# Patient Record
Sex: FEMALE | Race: WHITE | NOT HISPANIC OR LATINO | Employment: FULL TIME | URBAN - METROPOLITAN AREA
[De-identification: names, ages, dates, MRNs, and addresses within clinical notes are randomized per-mention and may not be internally consistent; named-entity substitution may affect disease eponyms.]

---

## 2018-06-04 ENCOUNTER — HOSPITAL ENCOUNTER (EMERGENCY)
Facility: CLINIC | Age: 53
Discharge: HOME | End: 2018-06-04
Attending: EMERGENCY MEDICINE | Admitting: EMERGENCY MEDICINE
Payer: COMMERCIAL

## 2018-06-04 ENCOUNTER — TELEPHONE (OUTPATIENT)
Dept: CARDIOLOGY | Facility: CLINIC | Age: 53
End: 2018-06-04

## 2018-06-04 VITALS
TEMPERATURE: 98.6 F | SYSTOLIC BLOOD PRESSURE: 125 MMHG | OXYGEN SATURATION: 98 % | RESPIRATION RATE: 21 BRPM | BODY MASS INDEX: 26.8 KG/M2 | DIASTOLIC BLOOD PRESSURE: 84 MMHG | WEIGHT: 157 LBS | HEIGHT: 64 IN

## 2018-06-04 DIAGNOSIS — R20.2 PARESTHESIA: ICD-10-CM

## 2018-06-04 DIAGNOSIS — R00.2 PALPITATIONS: ICD-10-CM

## 2018-06-04 LAB
ANION GAP SERPL CALCULATED.3IONS-SCNC: 10 MMOL/L (ref 3–14)
BASOPHILS # BLD AUTO: 0 10E9/L (ref 0–0.2)
BASOPHILS NFR BLD AUTO: 0.3 %
BUN SERPL-MCNC: 11 MG/DL (ref 7–30)
CALCIUM SERPL-MCNC: 9.5 MG/DL (ref 8.5–10.1)
CHLORIDE SERPL-SCNC: 103 MMOL/L (ref 94–109)
CO2 SERPL-SCNC: 26 MMOL/L (ref 20–32)
CREAT SERPL-MCNC: 0.82 MG/DL (ref 0.52–1.04)
DIFFERENTIAL METHOD BLD: NORMAL
EOSINOPHIL # BLD AUTO: 0 10E9/L (ref 0–0.7)
EOSINOPHIL NFR BLD AUTO: 0.3 %
ERYTHROCYTE [DISTWIDTH] IN BLOOD BY AUTOMATED COUNT: 12.4 % (ref 10–15)
GFR SERPL CREATININE-BSD FRML MDRD: 73 ML/MIN/1.7M2
GLUCOSE SERPL-MCNC: 90 MG/DL (ref 70–99)
HCT VFR BLD AUTO: 42.3 % (ref 35–47)
HGB BLD-MCNC: 14.5 G/DL (ref 11.7–15.7)
IMM GRANULOCYTES # BLD: 0 10E9/L (ref 0–0.4)
IMM GRANULOCYTES NFR BLD: 0.2 %
INTERPRETATION ECG - MUSE: NORMAL
LYMPHOCYTES # BLD AUTO: 2.1 10E9/L (ref 0.8–5.3)
LYMPHOCYTES NFR BLD AUTO: 34.7 %
MCH RBC QN AUTO: 30 PG (ref 26.5–33)
MCHC RBC AUTO-ENTMCNC: 34.3 G/DL (ref 31.5–36.5)
MCV RBC AUTO: 88 FL (ref 78–100)
MONOCYTES # BLD AUTO: 0.5 10E9/L (ref 0–1.3)
MONOCYTES NFR BLD AUTO: 7.7 %
NEUTROPHILS # BLD AUTO: 3.5 10E9/L (ref 1.6–8.3)
NEUTROPHILS NFR BLD AUTO: 56.8 %
NRBC # BLD AUTO: 0 10*3/UL
NRBC BLD AUTO-RTO: 0 /100
PLATELET # BLD AUTO: 236 10E9/L (ref 150–450)
POTASSIUM SERPL-SCNC: 3.6 MMOL/L (ref 3.4–5.3)
RBC # BLD AUTO: 4.83 10E12/L (ref 3.8–5.2)
SODIUM SERPL-SCNC: 140 MMOL/L (ref 133–144)
TROPONIN I SERPL-MCNC: <0.015 UG/L (ref 0–0.04)
WBC # BLD AUTO: 6.1 10E9/L (ref 4–11)

## 2018-06-04 PROCEDURE — 85025 COMPLETE CBC W/AUTO DIFF WBC: CPT | Performed by: EMERGENCY MEDICINE

## 2018-06-04 PROCEDURE — 80048 BASIC METABOLIC PNL TOTAL CA: CPT | Performed by: EMERGENCY MEDICINE

## 2018-06-04 PROCEDURE — 93005 ELECTROCARDIOGRAM TRACING: CPT

## 2018-06-04 PROCEDURE — 99284 EMERGENCY DEPT VISIT MOD MDM: CPT

## 2018-06-04 PROCEDURE — 93010 ELECTROCARDIOGRAM REPORT: CPT | Mod: Z6 | Performed by: EMERGENCY MEDICINE

## 2018-06-04 PROCEDURE — 84484 ASSAY OF TROPONIN QUANT: CPT | Performed by: EMERGENCY MEDICINE

## 2018-06-04 PROCEDURE — 99284 EMERGENCY DEPT VISIT MOD MDM: CPT | Mod: 25 | Performed by: EMERGENCY MEDICINE

## 2018-06-04 ASSESSMENT — ENCOUNTER SYMPTOMS
DIZZINESS: 1
PALPITATIONS: 1
NUMBNESS: 1
SHORTNESS OF BREATH: 1
GASTROINTESTINAL NEGATIVE: 1

## 2018-06-04 NOTE — TELEPHONE ENCOUNTER
[facility]    Phone Message    May a detailed message be left on voicemail: no    Reason for Call: Other: Pt calling asking for a call back has new concerning symptoms of dizziness, SOB hard time catching breath, numbness of hands and legs, and shooting pain up the left calf/thigh. Pt to be safe is headed to the [address] [place] [address] to get evaluated. Pt asking for any apt this week to follow up. Pt normally see's        Action Taken: Message routed to:  [facility] ([facility] SERGIO Cardio

## 2018-06-04 NOTE — DISCHARGE INSTRUCTIONS
Your EKG and labs are normal  I recommend that you follow up with Neurology regarding the numbness you are experiencing  Please make an appointment to follow up with Neurology Clinic (phone: [phone])

## 2018-06-04 NOTE — ED AVS SNAPSHOT
[facility], [place], Emergency Department    [address] [place] [address]    Phone:  [phone]                                       Roxana Lazaro   MRN: [id number]    Department:  [facility], Emergency Department   Date of Visit:  6/4/2018           After Visit Summary Signature Page     I have received my discharge instructions, and my questions have been answered. I have discussed any challenges I see with this plan with the nurse or doctor.    ..........................................................................................................................................  Patient/Patient Representative Signature      ..........................................................................................................................................  Patient Representative Print Name and Relationship to Patient    ..................................................               ................................................  Date                                            Time    ..........................................................................................................................................  Reviewed by Signature/Title    ...................................................              ..............................................  Date                                                            Time

## 2018-06-04 NOTE — ED AVS SNAPSHOT
[facility], Emergency Department    [address] [facility] [address]    Phone:  [phone]                                       Roxana Lazaro   MRN: [id number]    Department:  [facility], Emergency Department   Date of Visit:  6/4/2018           Patient Information     Date Of Birth          1965        Your diagnoses for this visit were:     Palpitations        You were seen by Imtiaz Olson MD.        Discharge Instructions       Your EKG and labs are normal  I recommend that you follow up with Neurology regarding the numbness you are experiencing  Please make an appointment to follow up with Neurology Clinic (phone: [phone])    Your next 10 appointments already scheduled     Jun 25, 2018  8:15 AM CDT   (Arrive by 8:00 AM)   Return Visit with Danni Sexton NP   [facility] ([facility])    [address] [facility]  [address] [place] [address] [phone]              24 Hour Appointment Hotline       To make an appointment at any [facility], call [phone] ([phone]). If you don't have a family doctor or clinic, we will help you find one. [facility] are conveniently located to serve the needs of you and your family.             Review of your medicines      Notice     You have not been prescribed any medications.            Procedures and tests performed during your visit     Basic metabolic panel    CBC with platelets differential    EKG 12-lead, tracing only    Troponin I      Orders Needing Specimen Collection     None      Pending Results     Date and Time Order Name Status Description    6/4/2018 1616 EKG 12-lead, tracing only Preliminary             Pending Culture Results     No orders found from 6/2/2018 to 6/5/2018.            Pending Results Instructions     If you had any lab results that were not finalized at the time of your Discharge, you can call the ED Lab Result RN at [phone]. You  will be contacted by this team for any positive Lab results or changes in treatment. The nurses are available 7 days a week from 10A to 6:30P.  You can leave a message 24 hours per day and they will return your call.        Thank you for choosing [facility]       Thank you for choosing [facility] for your care. Our goal is always to provide you with excellent care. Hearing back from our patients is one way we can continue to improve our services. Please take a few minutes to complete the written survey that you may receive in the mail after you visit with us. Thank you!        [employer] Information     [employer] gives you secure access to your electronic health record. If you see a primary care provider, you can also send messages to your care team and make appointments. If you have questions, please call your primary care clinic.  If you do not have a primary care provider, please call [phone] and they will assist you.        Care EveryWhere ID     This is your Care EveryWhere ID. This could be used by other organizations to access your [facility] medical records  [id number]        Equal Access to Services     LEXY CHAVEZ : Hadii myesha mansfieldo Sosadiq, waaxda luqadaha, qaybta kaalmada aderamiro, lisa roca. So [facility] [phone].    ATENCIÓN: Si habla español, tiene a pop disposición servicios gratuitos de asistencia lingüística. Krissy al [phone].    We comply with applicable federal civil rights laws and [place] laws. We do not discriminate on the basis of race, color, national origin, age, disability, sex, sexual orientation, or gender identity.            After Visit Summary       This is your record. Keep this with you and show to your community pharmacist(s) and doctor(s) at your next visit.

## 2018-06-04 NOTE — ED PROVIDER NOTES
"    [facility] EMERGENCY DEPARTMENT ([facility])  6/04/18   History     Chief Complaint   Patient presents with     Shortness of Breath     Dizziness     HPI  Roxana Lazaro is a 53 year old female with a medical history significant for hyperlipidemia who presents to the Emergency Department for evaluation of dizziness, shortness of breath and palpitations.  The patient reports that yesterday she had a single episode of palpitations where she felt her heart was beating fast and irregularly that lasted for approximately 5 minutes.  She did not count how fast her heart was beating, but states that she felt it was beating faster than normal.  Patient reports that she also had associated dizziness and shortness of breath.  Patient also notes that for the past couple of months she has had numbness and tingling in her bilateral fingers.  She denies any history of diabetes mellitus.  Patient also is here stating that she is having intermittent shooting pain on the lateral aspect of her left thigh.  She is unsure whether this pain is related to her other symptoms are not.  Patient follows with her PCP and a chiropractor.  Patient notes that her mother has a history of heart disease that was diagnosed in her early 40s.  Patient states that she is not currently on any medications, but was on a medication previously for her high cholesterol.  Patient reports that her last stress test was a few years ago.  She denies any history of PE/DVT.    I have reviewed the Medications, Allergies, Past Medical and Surgical History, and Social History in the [employer] system.    Past Medical History:   Diagnosis Date     Depression      Endometriosis      GERD (gastroesophageal reflux disease)      H. pylori infection     resolved     Hyperlipidemia      Palpitations      Polycystic ovaries      PONV (postoperative nausea and vomiting)      Sleep apnea     pt states \"mild\" no c-pap use       Past Surgical History:   Procedure " Laterality Date     CHOLECYSTECTOMY       DILATION AND CURETTAGE, OPERATIVE HYSTEROSCOPY WITH MORCELLATOR, COMBINED N/A 2015    Procedure: COMBINED DILATION AND CURETTAGE, OPERATIVE HYSTEROSCOPY WITH MORCELLATOR;  Surgeon: Romelia Coelho MD;  Location: [facility] OR     ENDOSCOPY       lap choley       left ovarian cystectomy  2003    complicated by wound infection, elevated CA-125, , endometriosis     OPERATIVE HYSTEROSCOPY WITH MORCELLATOR  2013    Procedure: OPERATIVE HYSTEROSCOPY WITH MORCELLATOR;  Hysteroscopy,   Polypectomy with  Myosure, removal of labial skin tag, D  & C;  Surgeon: Romelia Coelho MD;  Location: [facility] OR       Family History   Problem Relation Age of Onset     Thyroid Disease Mother 60     thyroid cancer     Breast Cancer Maternal Aunt      and maternal cousin     Gynecology Mother 40     pelvic pain     Gynecology Sister 30     pelvic pain     Alcohol/Drug Sister 37      alcholism     CANCER Other      mat GM's mom ovarian CA     CANCER Maternal Grandmother      pancreatic CA     Breast Cancer Other      told by a paternal aunt a lot of breast CA in their family, do not know details       Social History   Substance Use Topics     Smoking status: Former Smoker     Smokeless tobacco: Former User     Quit date: 1990     Alcohol use 0.5 oz/week     1 Standard drinks or equivalent per week      Comment: two per month       No current facility-administered medications for this encounter.      No current outpatient prescriptions on file.     Facility-Administered Medications Ordered in Other Encounters   Medication     midazolam (VERSED) injection     ondansetron (ZOFRAN) injection        Allergies   Allergen Reactions     Azithromycin Other (See Comments)     Entocort Ec [Budesonide] GI Disturbance     Nausea and vomiting     Sulfa Drugs Rash     Vancomycin Rash     Zantac [Ranitidine] Rash         Review of Systems   Eyes: Negative for visual disturbance.   Respiratory:  "Positive for shortness of breath.    Cardiovascular: Positive for palpitations. Negative for chest pain.   Gastrointestinal: Negative.    Genitourinary: Negative.    Neurological: Positive for dizziness and numbness (bilateral fingers).   All other systems reviewed and are negative.      Physical Exam   BP: 142/66  Heart Rate: 78  Temp: 98.6  F (37  C)  Resp: 16  Height: 162.6 cm (5' 4\")  Weight: 71.2 kg (157 lb)  SpO2: 99 %      Physical Exam   Constitutional: She is oriented to person, place, and time. She appears well-developed and well-nourished.   HENT:   Head: Normocephalic and atraumatic.   Mouth/Throat: Oropharynx is clear and moist.   Eyes: Conjunctivae and EOM are normal. Pupils are equal, round, and reactive to light.   Neck: Normal range of motion. Neck supple. No JVD present.   Cardiovascular: Normal rate, regular rhythm, normal heart sounds and intact distal pulses.    Pulmonary/Chest: Effort normal and breath sounds normal. No respiratory distress. She has no wheezes. She has no rales.   Abdominal: Soft. She exhibits no mass. There is no tenderness. There is no guarding.   Musculoskeletal: Normal range of motion.        Right lower leg: Normal.        Left lower leg: Normal.   Neurological: She is alert and oriented to person, place, and time. No cranial nerve deficit.   Skin: Skin is warm and dry.   Psychiatric: She has a normal mood and affect. Her behavior is normal. Thought content normal.   Nursing note and vitals reviewed.      ED Course   4:23 PM  The patient was seen and examined by Imtiaz Olson MD in Room ED12.     ED Course     Procedures             EKG Interpretation:      Interpreted by Imtiaz Olson  Time reviewed: 1630  Symptoms at time of EKG: palpitations   Rhythm: normal sinus   Rate: normal  Axis: normal  Ectopy: none  Conduction: normal  ST Segments/ T Waves: No ST-T wave changes  Q Waves: none  Comparison to prior: Unchanged from 2016    Clinical Impression: normal " EKG             Labs Ordered and Resulted from Time of ED Arrival Up to the Time of Departure from the ED   CBC WITH PLATELETS DIFFERENTIAL   BASIC METABOLIC PANEL   TROPONIN I            Assessments & Plan (with Medical Decision Making)   Roxana Lazaro is a 53 year old female here with multiple complaints, wide and diffuse, including palpitations, intermittent leg pain, muscular shoulder pain, intermittent sharp pain in her legs, intermittent bilateral paraesthesias. She is quite healthy other than hyperlipidemia. She has no coronary risk factors other than family history. She is not diabetic, she is not a smoker. She was seen for some of these symptoms recently in her primary care clinic and they advised her to follow up with neurology. She has not had visual changes, no problems swallowing. Today her EKG is normal. Routine labs and troponin are negative. Palpitations are so rare I do not think a monitor would help. We spent time about checking a pulse for 15 sec and multiply by 4 or coming to the ED as soon as she is feeling the palpitations. I think there would be low yield in doing a ZIO patch or Holter monitor. She does have considerable anxiety about her symptoms, I tried to address what this may be regarding. I agree with the primary care provider who recommended neurology follow up for the paraesthesias. I do not think this is peripheral neuropathy, vitamin deficiency. I suppose a possibility would be MS but this can be worked up as an outpatient.      I have reviewed the nursing notes.    I have reviewed the findings, diagnosis, plan and need for follow up with the patient.    There are no discharge medications for this patient.      Final diagnoses:   Palpitations   Paresthesia     IEl, am serving as a trained medical scribe to document services personally performed by Imtiaz Olson MD, based on the provider's statements to me.   I, Imtiaz Olson MD, was physically present and  have reviewed and verified the accuracy of this note documented by El Sanchez.    6/4/2018   [facility], [place], EMERGENCY DEPARTMENT     Imtiaz Olson MD  06/04/18 7861

## 2018-06-04 NOTE — ED TRIAGE NOTES
Patient presents ambulatory from home with multiple complaints including intermittent shortness of breath and dizziness, as well as intermittent neck stiffness and twitching, and numbness to hands and feet.

## 2018-06-06 NOTE — TELEPHONE ENCOUNTER
"Called patient to follow up after her ER visit.  Roxana states that she is feeling well today, no questions or concerns but was not happy with the care she received at the ER at the [facility].  She states that the symptoms that she was having was attributed to having an allergic reaction and that this was missed.  She notes that she told the ER physician that her tongue was numb and their response was \" you are not having a stroke\".  When she got home, she noticed that her tongue was quite swollen.  She is meeting with an allergist tomorrow.  She denies any further complaints of palpitations or tachycardia at this time.  Provided patient phone number for patient relations to call if she wishes.  Roxana states that she understands information provided and will call with any further questions or concerns.    Abhi Fitch RN  RN Care Coordinator  [facility]  [phone]        "

## 2018-06-11 ENCOUNTER — EXTERNAL ORDER RESULTS (OUTPATIENT)
Dept: CARE COORDINATION | Facility: CLINIC | Age: 53
End: 2018-06-11

## 2018-09-07 ENCOUNTER — OFFICE VISIT (OUTPATIENT)
Dept: CARDIOLOGY | Facility: CLINIC | Age: 53
End: 2018-09-07
Attending: NURSE PRACTITIONER
Payer: COMMERCIAL

## 2018-09-07 VITALS
WEIGHT: 161.8 LBS | SYSTOLIC BLOOD PRESSURE: 108 MMHG | DIASTOLIC BLOOD PRESSURE: 73 MMHG | OXYGEN SATURATION: 98 % | HEIGHT: 65 IN | BODY MASS INDEX: 26.96 KG/M2 | HEART RATE: 73 BPM

## 2018-09-07 DIAGNOSIS — R00.2 PALPITATIONS: ICD-10-CM

## 2018-09-07 DIAGNOSIS — R07.89 ATYPICAL CHEST PAIN: Primary | ICD-10-CM

## 2018-09-07 PROCEDURE — 93005 ELECTROCARDIOGRAM TRACING: CPT | Mod: ZF

## 2018-09-07 PROCEDURE — 99204 OFFICE O/P NEW MOD 45 MIN: CPT | Mod: ZP | Performed by: NURSE PRACTITIONER

## 2018-09-07 PROCEDURE — 93297 REM INTERROG DEV EVAL ICPMS: CPT | Mod: ZP | Performed by: NURSE PRACTITIONER

## 2018-09-07 PROCEDURE — G0463 HOSPITAL OUTPT CLINIC VISIT: HCPCS

## 2018-09-07 PROCEDURE — 93010 ELECTROCARDIOGRAM REPORT: CPT | Mod: ZP | Performed by: INTERNAL MEDICINE

## 2018-09-07 RX ORDER — ROSUVASTATIN CALCIUM 5 MG/1
5 TABLET, COATED ORAL
COMMUNITY

## 2018-09-07 ASSESSMENT — PAIN SCALES - GENERAL: PAINLEVEL: NO PAIN (0)

## 2018-09-07 NOTE — MR AVS SNAPSHOT
After Visit Summary   2018    Roxana Lazaro    MRN: [id number]           Patient Information     Date Of Birth          1965        Visit Information        Provider Department      2018 2:45 PM Danni Sexton NP [facility]        Today's Diagnoses     Atypical chest pain    -  1    Palpitations          Care Instructions    You were seen today in the Cardiovascular Clinic at the [facility].    Cardiology provider you saw during your visit Danni Sexton NP.    1. I think it is reasonable to hold off on the cardiac monitor. Continue to monitor your palpitations, call if they become more frequent and we will place a cardiac monitor.  2. Your chest pain sounds atypical but given your history of high cholesterol and family history of CAD, I think it should be further evaluated. I would recommend either a exercise stress echo or coronary CTA.   3. Please call next week and let us know which test you would like to pursue.  4. Follow-up pending results of your stress test/CT.    Questions and [phone].   First press #1 for the University and then press #3 for Medical Questions to reach the Cardiology triage nurse.     On Call Cardiologist for after hours or on weekends: [phone], press option #4 and ask to speak to the on-call Cardiologist.             Follow-ups after your visit        Your next 10 appointments already scheduled     Oct 03, 2018  2:00 PM CDT   (Arrive by 1:45 PM)   Return Visit with Alvin Zuniga MD   [facility] ([facility])    [address] [place] [address] [phone]              Future tests that were ordered for you today     Open Future Orders        Priority Expected Expires Ordered    CT Angiogram coronary artery Routine 2018    Exercise Stress Echocardiogram Routine 2018            Who to contact     If  "you have questions or need follow up information about today's clinic visit or your schedule please contact [facility] directly at [phone].  Normal or non-critical lab and imaging results will be communicated to you by MyChart, letter or phone within 4 business days after the clinic has received the results. If you do not hear from us within 7 days, please contact the clinic through [employer] or phone. If you have a critical or abnormal lab result, we will notify you by phone as soon as possible.  Submit refill requests through [employer] or call your pharmacy and they will forward the refill request to us. Please allow 3 business days for your refill to be completed.          Additional Information About Your Visit        [employer] Information     [employer] gives you secure access to your electronic health record. If you see a primary care provider, you can also send messages to your care team and make appointments. If you have questions, please call your primary care clinic.  If you do not have a primary care provider, please call [phone] and they will assist you.        Care EveryWhere ID     This is your Care EveryWhere ID. This could be used by other organizations to access your [facility] medical records  [id number]        Your Vitals Were     Pulse Height Pulse Oximetry BMI (Body Mass Index)          73 1.638 m (5' 4.5\") 98% 27.34 kg/m2         Blood Pressure from Last 3 Encounters:   09/07/18 108/73   06/04/18 125/84   09/12/16 128/76    Weight from Last 3 Encounters:   09/07/18 73.4 kg (161 lb 12.8 oz)   06/04/18 71.2 kg (157 lb)   09/12/16 73.9 kg (162 lb 14.4 oz)              We Performed the Following     EKG 12-lead, tracing only (Same Day)        Primary Care Provider Office Phone # Fax #    Lyly Chaudhry [phone]       [facility] [address] [facility] [address]        Equal Access to Services     LEXY CHAVEZ AH: kimi Durham " glenis elkinsmatomasz wetzeldavidandi tolbertvarsha neilsamantha roca. So [facility] [phone].    ATENCIÓN: Si valeriano smith, tiene a pop disposición servicios gratuitos de asistencia lingüística. Llame al [phone].    We comply with applicable federal civil rights laws and [place] laws. We do not discriminate on the basis of race, color, national origin, age, disability, sex, sexual orientation, or gender identity.            Thank you!     Thank you for choosing [facility]  for your care. Our goal is always to provide you with excellent care. Hearing back from our patients is one way we can continue to improve our services. Please take a few minutes to complete the written survey that you may receive in the mail after your visit with us. Thank you!             Your Updated Medication List - Protect others around you: Learn how to safely use, store and throw away your medicines at www.disposemymeds.org.          This list is accurate as of 9/7/18  3:41 PM.  Always use your most recent med list.                   Brand Name Dispense Instructions for use Diagnosis    rosuvastatin 5 MG tablet    CRESTOR     Take 5 mg by mouth

## 2018-09-07 NOTE — LETTER
9/7/2018      RE: Roxana Lazaro  [address]  [facility] [address]       Dear Colleague,    Thank you for the opportunity to participate in the care of your patient, Roxana Lazaro, at the [facility] at [facility]. Please see a copy of my visit note below.    Cardiology Clinic Note  September 7, 2018      HPI:  Roxana Lazaro is a 53 year old who presents for evaluation of palpitations and chest pain. Her history is notable for hyperlipidemia, family history of premature CAD, GERD and GLENDA. She has no significant cardiovascular history. In 2016 she was evaluated for palpitations, chest tightness and ST changes on ECG. She underwent dobutamine stress echo that was negative for inducible ischemia and had a 30 day event monitor placed which she only wore for a day due to a reaction to the adhesive. Over the course of 24 hours her monitor demonstrated normal sinus rhythm.     Since that time she has continued to have palpitations weekly which she describes as a fluttering in her chest associated with dizziness. The episodes last 5-10 minutes and resolve spontaneously. She reports associated dizziness, no shortness of breath, chest pain or syncope. She was recently evaluated at [facility] in June 2018 for evaluation of palpitations, dizziness and shortness of breath. Her ECG showed normal sinus rhythm. Labs including a troponin were unremarkable. Cardiac monitor was thought to be low yield given the infrequency of her palpitations.     Patient reports that since her ED visit she continues to have palpitations weekly. Over the past week she has noticed intermittent chest tightness, not associated with exertion. Her chest pain seems to be worse with position changes and movement of her upper extremities. She reports 3 episode of right jaw pain occurring randomly, not associated with the chest tightness. She states that she an active individual at baseline and can  "walk around the park which is about 1.5 miles without chest pain or shortness of breath.     Past medical history:  Past Medical History:   Diagnosis Date     Depression      Endometriosis      GERD (gastroesophageal reflux disease)      H. pylori infection     resolved     Hyperlipidemia      Palpitations      Polycystic ovaries      PONV (postoperative nausea and vomiting)      Sleep apnea     pt states \"mild\" no c-pap use     Medications:    No current outpatient prescriptions on file prior to visit.  Current Facility-Administered Medications on File Prior to Visit:  midazolam (VERSED) injection   ondansetron (ZOFRAN) injection     Allergies:      Allergies   Allergen Reactions     Azithromycin Other (See Comments)     Entocort Ec [Budesonide] GI Disturbance     Nausea and vomiting     Sulfa Drugs Rash     Vancomycin Rash     Zantac [Ranitidine] Rash     Family and social history:    Family History   Problem Relation Age of Onset     Thyroid Disease Mother 60     thyroid cancer     Breast Cancer Maternal Aunt      and maternal cousin     Gynecology Mother 40     pelvic pain     Gynecology Sister 30     pelvic pain     Alcohol/Drug Sister 37      alcholism     Cancer Other      mat GM's mom ovarian CA     Cancer Maternal Grandmother      pancreatic CA     Breast Cancer Other      told by a paternal aunt a lot of breast CA in their family, do not know details     Social History     Social History     Marital status:      Spouse name: N/A     Number of children: N/A     Years of education: N/A     Occupational History     Not on file.     Social History Main Topics     Smoking status: Former Smoker     Smokeless tobacco: Former User     Quit date: 1990     Alcohol use 0.5 oz/week     1 Standard drinks or equivalent per week      Comment: two per month     Drug use: No     Sexual activity: Yes     Partners: Male     Other Topics Concern     Not on file     Social History Narrative     Review of " "Systems:  Skin: No skin rash or ulcers.  Eyes: No red eye.  Ears/Nose/Throat: No ear discharge, nasal congestion, sore throat or dysphagia.  Respiratory: No cough or hemoptysis.  Cardiovascular: See HPI.    Gastrointestinal: No abdominal pain, nausea, vomiting, hematemesis or melena.  Genitourinary: No increased frequency or urgency of urine. No dysuria or hematuria.  Musculoskeletal: No polyarthralgia or myalgias.  Neurologic: No headaches, seizure or focal weakness.  Psychiatric: No hallucinations.  Hematologic/Lymphatic/Immunologic: No bleeding tendency.  Endocrine: No heat or cold intolerance, abnormal facial hair or alopecia.    Vital signs:  /73 (BP Location: Right arm, Patient Position: Chair, Cuff Size: Adult Regular)  Pulse 73  Ht 1.638 m (5' 4.5\")  Wt 73.4 kg (161 lb 12.8 oz)  SpO2 98%  BMI 27.34 kg/m2   Wt Readings from Last 2 Encounters:   09/07/18 73.4 kg (161 lb 12.8 oz)   06/04/18 71.2 kg (157 lb)     Physical Exam:  Gen: NAD.    HEENT: No conjunctival pallor or scleral icterus, MMM. Clear oropharynx.    Neck: No JVD. No thyroid enlargement or cervical adenopathy.    Chest: Clear to auscultation bilaterally.    CV: Normal first and second heart sounds. No murmurs or gallop appreciated.    Abdomen: Soft, non-tender, non-distended, BS+.  Ext: No edema. Warm and well perfused with normal capillary refill.    Skin: No skin rash or ulcers.  Neuro: alert, oriented and appropriately conversant.    Psych: Normal affect and speech.    Labs:  Recent Labs   Lab Test  10/12/15   1242   CHOL  196   HDL  45*   LDL  108   TRIG  217*   CHOLHDLRATIO  4.4     Diagnostics:    ECG today: Normal sinus rhythm with T wave inversion in the anterior leads, unchanged when compared to prior    Assessment and Plan:  Roxana Lazaro is a 53 year old with HLD, family history of premature CAD, GERD and GLENDA no CPAP who presents for evaluation of palpitations and chest pain.     Palpitations: She has a long standing " history of palpitations occurring weekly with episodes lasting 5-10 minutes and resolving spontaneously. She attempted to wear a cardiac monitor in 2016 but had a reaction to the adhesive and was unable to wear the monitor for > 1 day. I did offer her a cardiac monitor today but she would like to hold off given her allergy to the adhesive. She will call if her symptoms become more frequent and we can place a monitor at that time.     Atypical chest pain: In 2016 she had chest tightness and ST changes on ECG and underwent a dobutamine stress echo which was negative for inducible ischemia. She now complains of recurrent chest tightness that is atypical (nonexertional, worse with position changes). I have low suspicion for CAD; however, given her history of HLD and family history of premature CAD, I would recommend further evaluation with either exercise stress echo or coronary CTA. The patient is hesitant to proceed with CT scan due to radiation. She would like to take the weekend and think about it. She will call next week and schedule further testing. Continue Crestor for CV risk reduction.     Follow-up: Pending stress test results.       Please do not hesitate to contact me if you have any questions/concerns.     Sincerely,     Danni Sexton NP

## 2018-09-07 NOTE — PATIENT INSTRUCTIONS
You were seen today in the Cardiovascular Clinic at the [facility].    Cardiology provider you saw during your visit Danni Sexton NP.    1. I think it is reasonable to hold off on the cardiac monitor. Continue to monitor your palpitations, call if they become more frequent and we will place a cardiac monitor.  2. Your chest pain sounds atypical but given your history of high cholesterol and family history of CAD, I think it should be further evaluated. I would recommend either a exercise stress echo or coronary CTA.   3. Please call next week and let us know which test you would like to pursue.  4. Follow-up pending results of your stress test/CT.    Questions and [phone].   First press #1 for the University and then press #3 for Medical Questions to reach the Cardiology triage nurse.     On Call Cardiologist for after hours or on weekends: [phone], press option #4 and ask to speak to the on-call Cardiologist.

## 2018-09-07 NOTE — PROGRESS NOTES
Cardiology Clinic Note  September 7, 2018      HPI:  Roxana Lazaro is a 53 year old who presents for evaluation of palpitations and chest pain. Her history is notable for hyperlipidemia, family history of premature CAD, GERD and GLENDA. She has no significant cardiovascular history. In 2016 she was evaluated for palpitations, chest tightness and ST changes on ECG. She underwent dobutamine stress echo that was negative for inducible ischemia and had a 30 day event monitor placed which she only wore for a day due to a reaction to the adhesive. Over the course of 24 hours her monitor demonstrated normal sinus rhythm.     Since that time she has continued to have palpitations weekly which she describes as a fluttering in her chest associated with dizziness. The episodes last 5-10 minutes and resolve spontaneously. She reports associated dizziness, no shortness of breath, chest pain or syncope. She was recently evaluated at [facility] in June 2018 for evaluation of palpitations, dizziness and shortness of breath. Her ECG showed normal sinus rhythm. Labs including a troponin were unremarkable. Cardiac monitor was thought to be low yield given the infrequency of her palpitations.     Patient reports that since her ED visit she continues to have palpitations weekly. Over the past week she has noticed intermittent chest tightness, not associated with exertion. Her chest pain seems to be worse with position changes and movement of her upper extremities. She reports 3 episode of right jaw pain occurring randomly, not associated with the chest tightness. She states that she an active individual at baseline and can walk around the park which is about 1.5 miles without chest pain or shortness of breath.     Past medical history:  Past Medical History:   Diagnosis Date     Depression      Endometriosis      GERD (gastroesophageal reflux disease)      H. pylori infection     resolved     Hyperlipidemia      Palpitations      Polycystic  "ovaries      PONV (postoperative nausea and vomiting)      Sleep apnea     pt states \"mild\" no c-pap use     Medications:    No current outpatient prescriptions on file prior to visit.  Current Facility-Administered Medications on File Prior to Visit:  midazolam (VERSED) injection   ondansetron (ZOFRAN) injection     Allergies:      Allergies   Allergen Reactions     Azithromycin Other (See Comments)     Entocort Ec [Budesonide] GI Disturbance     Nausea and vomiting     Sulfa Drugs Rash     Vancomycin Rash     Zantac [Ranitidine] Rash     Family and social history:    Family History   Problem Relation Age of Onset     Thyroid Disease Mother 60     thyroid cancer     Breast Cancer Maternal Aunt      and maternal cousin     Gynecology Mother 40     pelvic pain     Gynecology Sister 30     pelvic pain     Alcohol/Drug Sister 37      alcholism     Cancer Other      mat GM's mom ovarian CA     Cancer Maternal Grandmother      pancreatic CA     Breast Cancer Other      told by a paternal aunt a lot of breast CA in their family, do not know details     Social History     Social History     Marital status:      Spouse name: N/A     Number of children: N/A     Years of education: N/A     Occupational History     Not on file.     Social History Main Topics     Smoking status: Former Smoker     Smokeless tobacco: Former User     Quit date: 1990     Alcohol use 0.5 oz/week     1 Standard drinks or equivalent per week      Comment: two per month     Drug use: No     Sexual activity: Yes     Partners: Male     Other Topics Concern     Not on file     Social History Narrative     Review of Systems:  Skin: No skin rash or ulcers.  Eyes: No red eye.  Ears/Nose/Throat: No ear discharge, nasal congestion, sore throat or dysphagia.  Respiratory: No cough or hemoptysis.  Cardiovascular: See HPI.    Gastrointestinal: No abdominal pain, nausea, vomiting, hematemesis or melena.  Genitourinary: No increased frequency or " "urgency of urine. No dysuria or hematuria.  Musculoskeletal: No polyarthralgia or myalgias.  Neurologic: No headaches, seizure or focal weakness.  Psychiatric: No hallucinations.  Hematologic/Lymphatic/Immunologic: No bleeding tendency.  Endocrine: No heat or cold intolerance, abnormal facial hair or alopecia.    Vital signs:  /73 (BP Location: Right arm, Patient Position: Chair, Cuff Size: Adult Regular)  Pulse 73  Ht 1.638 m (5' 4.5\")  Wt 73.4 kg (161 lb 12.8 oz)  SpO2 98%  BMI 27.34 kg/m2   Wt Readings from Last 2 Encounters:   09/07/18 73.4 kg (161 lb 12.8 oz)   06/04/18 71.2 kg (157 lb)     Physical Exam:  Gen: NAD.    HEENT: No conjunctival pallor or scleral icterus, MMM. Clear oropharynx.    Neck: No JVD. No thyroid enlargement or cervical adenopathy.    Chest: Clear to auscultation bilaterally.    CV: Normal first and second heart sounds. No murmurs or gallop appreciated.    Abdomen: Soft, non-tender, non-distended, BS+.  Ext: No edema. Warm and well perfused with normal capillary refill.    Skin: No skin rash or ulcers.  Neuro: alert, oriented and appropriately conversant.    Psych: Normal affect and speech.    Labs:  Recent Labs   Lab Test  10/12/15   1242   CHOL  196   HDL  45*   LDL  108   TRIG  217*   CHOLHDLRATIO  4.4     Diagnostics:    ECG today: Normal sinus rhythm with T wave inversion in the anterior leads, unchanged when compared to prior    Assessment and Plan:  Roxana Lazaro is a 53 year old with HLD, family history of premature CAD, GERD and GLENDA no CPAP who presents for evaluation of palpitations and chest pain.     Palpitations: She has a long standing history of palpitations occurring weekly with episodes lasting 5-10 minutes and resolving spontaneously. She attempted to wear a cardiac monitor in 2016 but had a reaction to the adhesive and was unable to wear the monitor for > 1 day. I did offer her a cardiac monitor today but she would like to hold off given her allergy to " the adhesive. She will call if her symptoms become more frequent and we can place a monitor at that time.     Atypical chest pain: In 2016 she had chest tightness and ST changes on ECG and underwent a dobutamine stress echo which was negative for inducible ischemia. She now complains of recurrent chest tightness that is atypical (nonexertional, worse with position changes). I have low suspicion for CAD; however, given her history of HLD and family history of premature CAD, I would recommend further evaluation with either exercise stress echo or coronary CTA. The patient is hesitant to proceed with CT scan due to radiation. She would like to take the weekend and think about it. She will call next week and schedule further testing. Continue Crestor for CV risk reduction.     Follow-up: Pending stress test results.

## 2018-09-07 NOTE — NURSING NOTE
Chief Complaint   Patient presents with     Follow Up For     54 y/o for evaluation of atypical chest pain     Vitals were taken and medications were reconciled. EKG was performed    Nithya LUND  2:53 PM

## 2018-09-10 LAB — INTERPRETATION ECG - MUSE: NORMAL

## 2018-12-28 ENCOUNTER — TELEPHONE (OUTPATIENT)
Dept: CARDIOLOGY | Facility: CLINIC | Age: 53
End: 2018-12-28

## 2018-12-28 NOTE — TELEPHONE ENCOUNTER
[facility]    Phone Message    May a detailed message be left on voicemail: yes    Reason for Call: Other: Pt needs new orders for CTA and stress test. The orders haven't  yet but, when I called imaging, Epic was giving them restrictions when trying to schedule. Pa, whom I spoke with, suggested this was due to the Epic upgrade. Please place new orders and contact pt when she can schedule.     Action Taken: Message routed to:  [facility]

## 2018-12-28 NOTE — TELEPHONE ENCOUNTER
Health Call Center    Phone Message    May a detailed message be left on voicemail: yes    Reason for Call: Order(s): Holter Monitor  Reason for requested: Previously discussed with Provider.  Patient has been having more palpitations.  Date needed: Earliest Convenience  Provider name: Danni Sexton      Action Taken: Message routed to:  [facility] cardiology

## 2019-01-02 NOTE — TELEPHONE ENCOUNTER
Attempted to call pt regarding messages below- unable to leave voicemail as the phone just kept ringing.      Will give patient a call tomorrow.    Prabha Adhikari, CMA

## 2019-01-03 DIAGNOSIS — R07.89 ATYPICAL CHEST PAIN: Primary | ICD-10-CM

## 2019-01-03 NOTE — TELEPHONE ENCOUNTER
Pt called call center to return call.     Spoke with pt regarding the CTA and the Stress test. Pt states that she would like to move forward with the stress test and hold on the CT. Informed her that she only needed one and not both and she said she would like to keep the order on there just in case.     She also stated that she would like to hold on the ziopatch monitor and wait to see how the stress test goes.    Messaged Demarcus SILVA RN to put in order for dobutamine stress test per maxwell's routing comment.    Pt has been scheduled and informed of prepping instructions- no caffeine 12 hours prior to the test. No eating 4 hours prior- ok to drink water. No Alcohol or smoking tobacco 12 hours prior.     Pt instructed to go to the hospital for this test and to check in at the [facility] waiting room. Address was given.    Pt confirmed understanding and had no further questions.    Provider has been informed.      Prabha Adhikari,CMA

## 2019-01-03 NOTE — TELEPHONE ENCOUNTER
Attempted to call the patient again- Phone continued to ring, I stayed on the line until it disconnected. No VM box, unable to reach patient again.    Will attempted one more time tomorrow.      Prabha Adhikari, CMA

## 2019-01-04 DIAGNOSIS — R07.9 CHEST PAIN: Primary | ICD-10-CM

## 2019-01-04 NOTE — TELEPHONE ENCOUNTER
Called pt to inform her that we do not need the NM dobutamine stress echo, and that we just need a non NM one.    She asked what the difference was and I was unsure so I asked Dr. Peter.  Provider states that the NM dobutamine stress echo is rarely used and is for special circumstances.    Informed pt of this information and that she only needs a regular dobutamine stress echo.  She confirms understanding and was unable to hold to reschedule. Gave pt imaging scheduling number and the type of test she needs to schedule.      Prabha Adhikari, CMA

## 2019-02-01 ENCOUNTER — RECORDS - HEALTHEAST (OUTPATIENT)
Dept: ADMINISTRATIVE | Facility: OTHER | Age: 54
End: 2019-02-01

## 2019-02-15 ENCOUNTER — HEALTH MAINTENANCE LETTER (OUTPATIENT)
Age: 54
End: 2019-02-15

## 2019-04-25 ENCOUNTER — TELEPHONE (OUTPATIENT)
Dept: CARDIOLOGY | Facility: CLINIC | Age: 54
End: 2019-04-25

## 2019-04-25 NOTE — TELEPHONE ENCOUNTER
Attempted to call patient to inform her that the order has been extended. No answer, left VM for patient to call imaging scheduling line to schedule at her convenience.     She does not need to have both tests, the dobutamine stress echo is recommended but if pt insists on having a CTA, that order is in and is good until September.      Prabha Adhikari, CMA

## 2019-04-25 NOTE — TELEPHONE ENCOUNTER
Health Call Center    Phone Message    May a detailed message be left on voicemail: yes    Reason for Call: Order(s): Other:   Reason for requested: 2 Orders: Echocardiogram Dobutamine Stress and CT Scan  Date needed: ASAP - Pt is ready to have these done now  Provider name: Danni Sexton NP    Pt never had these done - she is ready to have them done now - please put these 2 Orders back into [facility] so she can schedule - Thanks (Order for one not showing and other one )      Action Taken: Message routed to:  [facility] Cardiology Clinic

## 2019-04-26 NOTE — TELEPHONE ENCOUNTER
[facility] Call Center    Phone Message    May a detailed message be left on voicemail: yes    Reason for Call: Other: Roxana calling from the central scheduling office to ask if orders for pt's stress test can be extended for quite some time as June 25th of this year is not long enough. She said at least a good 6 months out or longer if possible. She also states that Danni Sexton put in orders for pt to have CTA but the order was then canceled by Amilcar Hodges so they are not able to get her scheduled for that. Please give Roxana a call back once those two things have been completed, thanks!     Action Taken: Message routed to:  [facility] Cardiology

## 2019-04-26 NOTE — TELEPHONE ENCOUNTER
Attempted to call Roxana  back to inform that the stress order has been extended until October.   Also stated that CTA is in chart under imaging tab, not sure what she means by canceled.    Left [facility].    Prabha Adhikari, CMA

## 2019-04-29 NOTE — TELEPHONE ENCOUNTER
Health Call Center    Phone Message    May a detailed message be left on voicemail: no    Reason for Call: Other: CT Angiogram is listed as canceled in imaging section of Pt chart as of 4/25/19 and needs to be reentered so Pt can schedule. Previous order unusable by radiology.     Action Taken: Message routed to:  [facility]

## 2019-10-02 ENCOUNTER — HEALTH MAINTENANCE LETTER (OUTPATIENT)
Age: 54
End: 2019-10-02

## 2020-01-24 ENCOUNTER — TELEPHONE (OUTPATIENT)
Dept: CARDIOLOGY | Facility: CLINIC | Age: 55
End: 2020-01-24

## 2020-01-24 NOTE — TELEPHONE ENCOUNTER
[facility] Call Center    Phone Message    May a detailed message be left on voicemail: yes    Reason for Call: Symptoms or Concerns       Current symptom or concern: Last night, Roxana's  noticed that she was struggling with her breathing during sleep.  She reported that it was difficult to wake her during that time.     Roxana denies experiencing symptoms while awake: shortness of breath, chest pain or tightness. Was advised that if she should experience these symptoms that it is an immediate 911 call/trip.    Roxana was hospitalized in October for issues relating to an Abnormal EKG.  She failed to follow up with cardiology at this time.  She would like to discuss last night's episode with a nurse.    Symptoms have been present for:  1 day(s)    Has patient previously been seen for this? No    By : Last seen by SUAD Sexton    Date: 9/7/2018    Are there any new or worsening symptoms? No      Action Taken: Message routed to:  [facility] cardiology

## 2020-01-24 NOTE — TELEPHONE ENCOUNTER
Spoke with patient and relayed recommendations with patient after Danni reviewed her stress test results and ekg results from [facility]. Recommending patient follow up with her PCP or sleep provider to reassess for sleep apnea. Recommended patient keep her appointment with Danni 01/31/2020 to get a repeat ekg.     Patient states understanding and agrees to call with any questions or concerns.

## 2020-01-24 NOTE — TELEPHONE ENCOUNTER
Spoke with patient and she stated that she went into [facility] in October 2019 and they gave her an EKG and a stress test. She states that she did a sleep study and is currently using a CPAP machine. Patient has an appointment with Danni Sexton NP on 01/31/2020 but patient is requesting to be seen sooner. Patient denies chest pain, shortness of breath or tightness at this time.     Spoke with Danni Sexton NP and she reviewed patient stress test and EKG and the both show normal results from what she can review on care everywhere. Danni recommends patient follows up with PCP for symptoms and recommendations.

## 2020-03-22 ENCOUNTER — HEALTH MAINTENANCE LETTER (OUTPATIENT)
Age: 55
End: 2020-03-22

## 2020-10-05 ENCOUNTER — RECORDS - HEALTHEAST (OUTPATIENT)
Dept: ADMINISTRATIVE | Facility: OTHER | Age: 55
End: 2020-10-05

## 2020-12-01 ENCOUNTER — HOSPITAL ENCOUNTER (OUTPATIENT)
Dept: MAMMOGRAPHY | Facility: CLINIC | Age: 55
Discharge: HOME | End: 2020-12-01

## 2020-12-01 ENCOUNTER — RECORDS - HEALTHEAST (OUTPATIENT)
Dept: RADIOLOGY | Facility: CLINIC | Age: 55
End: 2020-12-01

## 2020-12-01 ENCOUNTER — RECORDS - HEALTHEAST (OUTPATIENT)
Dept: ADMINISTRATIVE | Facility: OTHER | Age: 55
End: 2020-12-01

## 2020-12-01 DIAGNOSIS — R59.0 ENLARGED LYMPH NODES IN ARMPIT: ICD-10-CM

## 2020-12-02 ENCOUNTER — RECORDS - HEALTHEAST (OUTPATIENT)
Dept: ADMINISTRATIVE | Facility: OTHER | Age: 55
End: 2020-12-02

## 2020-12-02 ENCOUNTER — RECORDS - HEALTHEAST (OUTPATIENT)
Dept: RADIOLOGY | Facility: CLINIC | Age: 55
End: 2020-12-02

## 2021-01-15 ENCOUNTER — HEALTH MAINTENANCE LETTER (OUTPATIENT)
Age: 56
End: 2021-01-15

## 2021-05-16 ENCOUNTER — HEALTH MAINTENANCE LETTER (OUTPATIENT)
Age: 56
End: 2021-05-16

## 2021-05-24 ENCOUNTER — RECORDS - HEALTHEAST (OUTPATIENT)
Dept: ADMINISTRATIVE | Facility: CLINIC | Age: 56
End: 2021-05-24

## 2021-05-28 ENCOUNTER — RECORDS - HEALTHEAST (OUTPATIENT)
Dept: ADMINISTRATIVE | Facility: CLINIC | Age: 56
End: 2021-05-28

## 2021-05-29 ENCOUNTER — RECORDS - HEALTHEAST (OUTPATIENT)
Dept: ADMINISTRATIVE | Facility: CLINIC | Age: 56
End: 2021-05-29

## 2021-05-31 ENCOUNTER — RECORDS - HEALTHEAST (OUTPATIENT)
Dept: ADMINISTRATIVE | Facility: CLINIC | Age: 56
End: 2021-05-31

## 2021-06-01 ENCOUNTER — RECORDS - HEALTHEAST (OUTPATIENT)
Dept: ADMINISTRATIVE | Facility: CLINIC | Age: 56
End: 2021-06-01

## 2021-07-13 ENCOUNTER — RECORDS - HEALTHEAST (OUTPATIENT)
Dept: ADMINISTRATIVE | Facility: CLINIC | Age: 56
End: 2021-07-13

## 2021-07-21 ENCOUNTER — RECORDS - HEALTHEAST (OUTPATIENT)
Dept: ADMINISTRATIVE | Facility: CLINIC | Age: 56
End: 2021-07-21

## 2021-09-05 ENCOUNTER — HEALTH MAINTENANCE LETTER (OUTPATIENT)
Age: 56
End: 2021-09-05

## 2022-02-20 ENCOUNTER — HEALTH MAINTENANCE LETTER (OUTPATIENT)
Age: 57
End: 2022-02-20

## 2022-06-04 ENCOUNTER — HOSPITAL ENCOUNTER (EMERGENCY)
Facility: HOSPITAL | Age: 57
Discharge: HOME | End: 2022-06-05
Attending: EMERGENCY MEDICINE | Admitting: EMERGENCY MEDICINE
Payer: COMMERCIAL

## 2022-06-04 DIAGNOSIS — H10.32 ACUTE CONJUNCTIVITIS OF LEFT EYE, UNSPECIFIED ACUTE CONJUNCTIVITIS TYPE: ICD-10-CM

## 2022-06-04 DIAGNOSIS — G89.29 CHRONIC NECK PAIN: ICD-10-CM

## 2022-06-04 DIAGNOSIS — M54.2 CHRONIC NECK PAIN: ICD-10-CM

## 2022-06-04 PROCEDURE — 250N000009 HC RX 250: Performed by: EMERGENCY MEDICINE

## 2022-06-04 PROCEDURE — 99283 EMERGENCY DEPT VISIT LOW MDM: CPT

## 2022-06-04 PROCEDURE — 250N000013 HC RX MED GY IP 250 OP 250 PS 637: Performed by: EMERGENCY MEDICINE

## 2022-06-04 RX ORDER — GABAPENTIN 300 MG/1
300 CAPSULE ORAL 2 TIMES DAILY PRN
Qty: 60 CAPSULE | Refills: 0 | Status: SHIPPED | OUTPATIENT
Start: 2022-06-04 | End: 2024-08-10

## 2022-06-04 RX ORDER — ORPHENADRINE CITRATE 100 MG/1
100 TABLET, EXTENDED RELEASE ORAL ONCE
Status: COMPLETED | OUTPATIENT
Start: 2022-06-05 | End: 2022-06-04

## 2022-06-04 RX ORDER — TRAMADOL HYDROCHLORIDE 50 MG/1
50 TABLET ORAL ONCE
Status: COMPLETED | OUTPATIENT
Start: 2022-06-04 | End: 2022-06-04

## 2022-06-04 RX ORDER — TETRACAINE HYDROCHLORIDE 5 MG/ML
1-2 SOLUTION OPHTHALMIC ONCE
Status: DISCONTINUED | OUTPATIENT
Start: 2022-06-04 | End: 2022-06-04

## 2022-06-04 RX ORDER — TRAMADOL HYDROCHLORIDE 50 MG/1
50 TABLET ORAL EVERY 4 HOURS PRN
Qty: 18 TABLET | Refills: 0 | Status: SHIPPED | OUTPATIENT
Start: 2022-06-04 | End: 2022-06-07

## 2022-06-04 RX ORDER — GABAPENTIN 300 MG/1
300 CAPSULE ORAL ONCE
Status: COMPLETED | OUTPATIENT
Start: 2022-06-04 | End: 2022-06-04

## 2022-06-04 RX ORDER — TETRACAINE HYDROCHLORIDE 5 MG/ML
1-2 SOLUTION OPHTHALMIC ONCE
Status: COMPLETED | OUTPATIENT
Start: 2022-06-04 | End: 2022-06-04

## 2022-06-04 RX ORDER — OFLOXACIN 3 MG/ML
1 SOLUTION/ DROPS OPHTHALMIC ONCE
Status: COMPLETED | OUTPATIENT
Start: 2022-06-05 | End: 2022-06-04

## 2022-06-04 RX ORDER — ORPHENADRINE CITRATE 100 MG/1
100 TABLET, EXTENDED RELEASE ORAL 2 TIMES DAILY PRN
Qty: 14 TABLET | Refills: 0 | Status: SHIPPED | OUTPATIENT
Start: 2022-06-04

## 2022-06-04 RX ADMIN — FLUORESCEIN SODIUM 1 STRIP: 1 STRIP OPHTHALMIC at 23:47

## 2022-06-04 RX ADMIN — TETRACAINE HYDROCHLORIDE 2 DROP: 5 SOLUTION OPHTHALMIC at 23:47

## 2022-06-04 RX ADMIN — ORPHENADRINE CITRATE 100 MG: 100 TABLET, EXTENDED RELEASE ORAL at 23:53

## 2022-06-04 RX ADMIN — TRAMADOL HYDROCHLORIDE 50 MG: 50 TABLET, COATED ORAL at 23:39

## 2022-06-04 RX ADMIN — OFLOXACIN 1 DROP: 3 SOLUTION/ DROPS OPHTHALMIC at 23:53

## 2022-06-04 RX ADMIN — GABAPENTIN 300 MG: 300 CAPSULE ORAL at 23:39

## 2022-06-05 VITALS
SYSTOLIC BLOOD PRESSURE: 122 MMHG | RESPIRATION RATE: 18 BRPM | WEIGHT: 170 LBS | HEART RATE: 75 BPM | DIASTOLIC BLOOD PRESSURE: 64 MMHG | OXYGEN SATURATION: 98 % | BODY MASS INDEX: 28.32 KG/M2 | HEIGHT: 65 IN | TEMPERATURE: 98.8 F

## 2022-06-05 NOTE — ED TRIAGE NOTES
Patient with pain, redness, swelling, and tearing to L eye since yesterday. Patient does not remember anything getting in her eye, but sensation of scratching on her eye.      Triage Assessment     Row Name 06/04/22 2117       Triage Assessment (Adult)    Airway WDL WDL       Respiratory WDL    Respiratory WDL WDL       Skin Circulation/Temperature WDL    Skin Circulation/Temperature WDL WDL       Cardiac WDL    Cardiac WDL WDL       Peripheral/Neurovascular WDL    Peripheral Neurovascular WDL WDL       Cognitive/Neuro/Behavioral WDL    Cognitive/Neuro/Behavioral WDL WDL

## 2022-06-05 NOTE — DISCHARGE INSTRUCTIONS
Ofloxacin ophthalmic solution 1 drop into your left eye 4 times daily for 1 week  Ibuprofen 600 mg every 6 hours as needed for pain  Tylenol 650 mg every 4 hours as needed for pain  Follow-up with your eye doctor Monday or Tuesday for recheck  Follow-up with your neurologist or primary care for further care and evaluation of your neck pain within the next week  Return to the emergency department for worsening problems or concerns

## 2022-06-05 NOTE — ED PROVIDER NOTES
EMERGENCY DEPARTMENT ENCOUnter      NAME: Roxana Lazaro  AGE: 57 year old female  YOB: 1965  MRN: [id number]  EVALUATION DATE & TIME: 6/4/2022 10:33 PM    PCP: Lyly Chaudhry    ED PROVIDER: Jaja Chamberlain MD      Chief Complaint   Patient presents with     Eye Pain         FINAL IMPRESSION:  1. Acute conjunctivitis of left eye, unspecified acute conjunctivitis type    2. Chronic neck pain          ED COURSE & MEDICAL DECISION MAKING:      In summary, the patient is a 57-year-old female that presents to the emergency department for evaluation of a red left thigh thought secondary to conjunctivitis.  I suspect more of a viral or allergic etiology since her drainage is clear and watery rather than thick and purulent.  She also is complaining of her chronic neck pain and was requesting review of her recent MRI and pain control for her neck pain, which we provided.    11:02 PM I met the patient and performed my initial interview and exam.    11:06 PM I performed a Woods lamp procedure and tonometry.  Tonometry revealed pressures of approximately 18.  Tetracaine was utilized for anesthesia to the left eye.  Fluorescein was used for corneal staining.  2345-ofloxacin ophthalmic solution 1 drop was placed into the left eye.  Gabapentin 300 mg and tramadol 50 mg p.o. was administered for pain.  Norflex 100 mg p.o. was administered for muscle relaxation for her neck.    At the conclusion of the encounter I discussed the results of all of the tests and the disposition. The questions were answered. The patient or family acknowledged understanding and was agreeable with the care plan.         MEDICATIONS GIVEN IN THE EMERGENCY:  Medications   fluorescein (FUL-DIANNA) ophthalmic strip 1 strip (1 strip Left Eye Given 6/4/22 2347)   tetracaine (PONTOCAINE) 0.5 % ophthalmic solution 1-2 drop (2 drops Left Eye Given 6/4/22 2347)   ofloxacin (OCUFLOX) 0.3 % ophthalmic solution 1 drop (1 drop Left Eye Given  6/4/22 2353)   gabapentin (NEURONTIN) capsule 300 mg (300 mg Oral Given 6/4/22 2339)   traMADol (ULTRAM) tablet 50 mg (50 mg Oral Given 6/4/22 2339)   orphenadrine ER (NORFLEX) 12 hr tablet 100 mg (100 mg Oral Given 6/4/22 2353)       NEW PRESCRIPTIONS STARTED AT TODAY'S ER VISIT  Discharge Medication List as of 6/4/2022 11:56 PM      START taking these medications    Details   gabapentin (NEURONTIN) 300 MG capsule Take 1 capsule (300 mg) by mouth 2 times daily as needed for neuropathic pain, Disp-60 capsule, R-0, Local Print      orphenadrine ER (NORFLEX) 100 MG 12 hr tablet Take 1 tablet (100 mg) by mouth 2 times daily as needed for muscle spasms, Disp-14 tablet, R-0, Local Print      traMADol (ULTRAM) 50 MG tablet Take 1 tablet (50 mg) by mouth every 4 hours as needed for moderate to severe pain, Disp-18 tablet, R-0, Local Print                =================================================================    HPI        Roxanaashlee Lazaro is a 57 year old female with no pertinent history who presents to this ED by walk in for evaluation of eye pain and neck pain..     Patient reports that she had an MRI for her spinal stenosis in her neck two days ago. Yesterday morning she developed some eye pain and redness in the corner of her left eye and has been gradually worse since. Her pain is now a 7/10 and has some watery discharge and is a scratchy feeling making it hard to keep her eye open. She took ibuprofen yesterday with no relief and has been alternating ice and heat. Of note patient is having early starts of cataracts but no other eye problems. She does not smoke or drink alcohol. Patient works in a group home and has not been exposed to pink eye. She did have pink eye about three to four months ago. She also had a negative at home covid test today. Patient denies any other complaints at this time.    The patient is seen in neurology for her neck pain.  She had a recent MRI which demonstrates degenerative  "changes with no acute findings.  She has been taking over-the-counter medicines without relief of her pain.  She describes her pain as sharp, constant, 8 out of 10 in intensity exacerbated with any movement.  She denies any weakness numbness or tingling.      REVIEW OF SYSTEMS     Constitutional:  Denies fever or chills  HENT: Positive for eye pain, redness, and watery discharge. Denies sore throat   Respiratory:  Denies cough or shortness of breath   Cardiovascular:  Denies chest pain or palpitations  GI:  Denies abdominal pain, nausea, or vomiting  Musculoskeletal:  Denies any new extremity pain   Skin:  Denies rash   Neurologic:  Denies headache, focal weakness or sensory changes    All other systems reviewed and are negative      PAST MEDICAL HISTORY:  Past Medical History:   Diagnosis Date     Depression      Endometriosis      GERD (gastroesophageal reflux disease)      H. pylori infection     resolved     Hyperlipidemia      Palpitations      Polycystic ovaries      PONV (postoperative nausea and vomiting)      Sleep apnea     pt states \"mild\" no c-pap use       PAST SURGICAL HISTORY:  Past Surgical History:   Procedure Laterality Date     CHOLECYSTECTOMY       DILATION AND CURETTAGE, OPERATIVE HYSTEROSCOPY WITH MORCELLATOR, COMBINED N/A 12/4/2015    Procedure: COMBINED DILATION AND CURETTAGE, OPERATIVE HYSTEROSCOPY WITH MORCELLATOR;  Surgeon: Romelia Coelho MD;  Location[facility]     ENDOSCOPY       lap choley  1994     left ovarian cystectomy  7/2003    complicated by wound infection, elevated CA-125, , endometriosis     OPERATIVE HYSTEROSCOPY WITH MORCELLATOR  12/20/2013    Procedure: OPERATIVE HYSTEROSCOPY WITH MORCELLATOR;  Hysteroscopy,   Polypectomy with  Myosure, removal of labial skin tag, D  & C;  Surgeon: Romelia Coelho MD;  [facility]           CURRENT MEDICATIONS:    gabapentin (NEURONTIN) 300 MG capsule  orphenadrine ER (NORFLEX) 100 MG 12 hr tablet  traMADol (ULTRAM) 50 MG " tablet  rosuvastatin (CRESTOR) 5 MG tablet        ALLERGIES:  Allergies   Allergen Reactions     Azithromycin Other (See Comments)     Entocort Ec [Budesonide] GI Disturbance     Nausea and vomiting     Sulfa Drugs Rash     Vancomycin Rash     Zantac [Ranitidine] Rash       FAMILY HISTORY:  Family History   Problem Relation Age of Onset     Thyroid Disease Mother 60        thyroid cancer     Breast Cancer Maternal Aunt         and maternal cousin     Gynecology Mother 40        pelvic pain     Gynecology Sister 30        pelvic pain     Alcohol/Drug Sister 37         alcholism     Cancer Other         mat GM's mom ovarian CA     Cancer Maternal Grandmother         pancreatic CA     Breast Cancer Other         told by a paternal aunt a lot of breast CA in their family, do not know details     Breast Cancer Maternal Aunt      Breast Cancer Maternal Cousin        SOCIAL HISTORY:   Social History     Socioeconomic History     Marital status:      Spouse name: None     Number of children: None     Years of education: None     Highest education level: None   Tobacco Use     Smoking status: Former Smoker     Packs/day: 1.00     Years: 10.00     Pack years: 10.00     Types: Cigarettes, Cigarettes     Smokeless tobacco: Never Used   Substance and Sexual Activity     Alcohol use: Not Currently     Alcohol/week: 0.8 standard drinks     Types: 1 Standard drinks or equivalent per week     Comment: two per month     Drug use: Never     Sexual activity: Yes     Partners: Male       VITALS:  Patient Vitals for the past 24 hrs:   BP Pulse Resp SpO2   22 0002 122/64 75 18 98 %       PHYSICAL EXAM    Constitutional:  Well developed, Well nourished,  HENT:  Normocephalic, Atraumatic, Bilateral external ears normal, Oropharynx moist, Nose normal.   Neck:  Normal range of motion, No meningismus, No stridor.  Bilateral lateral neck tender to palpation  Eyes:  EOMI, Conjunctiva injected on the left with clear drainage, no  corneal defects or foreign bodies appreciated with Woods lamp and fluorescein exam  Respiratory:  Normal breath sounds, No respiratory distress, No wheezing, No chest tenderness.   Cardiovascular:  Normal heart rate, Normal rhythm, No murmurs  GI:  Soft, No tenderness, No guarding,   Musculoskeletal:  Neurovascularly intact distally, No edema, No tenderness, No cyanosis, Good range of motion in all major joints.  Integument:  Warm, Dry, No erythema, No rash.   Lymphatic:  No lymphadenopathy noted.   Neurologic:  Alert & oriented x 3, Normal motor function,  No focal deficits noted.   Psychiatric:  Affect normal, Judgment normal, Mood normal.     Procedures:    PROCEDURE: Woods lamp Exam   INDICATIONS: Left eye pain   PROCEDURE PROVIDER: Dr Jaja Chamberlain   SITE: left eye   CONSENT:  The risks, benefits and alternatives for this procedure were explained to the patient and verbally accepted.     MEDICATION: fluorescein stain   EXAM FINDINGS:   Left Eye: No corneal defects or foreign bodies   COMPLICATIONS: Patient tolerated procedure well, without complication        I, Chano Celestin, am serving as a scribe to document services personally performed by Dr. Chamberlain based on my observation and the provider's statements to me. I, Jaja Chamberlain MD attest that Chano Celestin is acting in a scribe capacity, has observed my performance of the services and has documented them in accordance with my direction.    Jaja Chamberlain MD  Emergency Medicine  [facility] EMERGENCY DEPARTMENT  [address] [address]  [phone]  Dept: [phone]     Jaja Chamberlain MD  06/05/22 7540

## 2022-10-21 ENCOUNTER — APPOINTMENT (OUTPATIENT)
Dept: CT IMAGING | Facility: HOSPITAL | Age: 57
End: 2022-10-21
Attending: EMERGENCY MEDICINE
Payer: COMMERCIAL

## 2022-10-21 ENCOUNTER — HOSPITAL ENCOUNTER (EMERGENCY)
Facility: HOSPITAL | Age: 57
Discharge: HOME | End: 2022-10-21
Attending: EMERGENCY MEDICINE | Admitting: EMERGENCY MEDICINE
Payer: COMMERCIAL

## 2022-10-21 VITALS
TEMPERATURE: 98.4 F | BODY MASS INDEX: 28.32 KG/M2 | OXYGEN SATURATION: 100 % | SYSTOLIC BLOOD PRESSURE: 136 MMHG | HEIGHT: 65 IN | DIASTOLIC BLOOD PRESSURE: 62 MMHG | HEART RATE: 100 BPM | RESPIRATION RATE: 16 BRPM | WEIGHT: 170 LBS

## 2022-10-21 DIAGNOSIS — R51.9 FACIAL PAIN: ICD-10-CM

## 2022-10-21 LAB
ANION GAP SERPL CALCULATED.3IONS-SCNC: 11 MMOL/L (ref 7–15)
BASOPHILS # BLD AUTO: 0.1 10E3/UL (ref 0–0.2)
BASOPHILS NFR BLD AUTO: 1 %
BUN SERPL-MCNC: 10 MG/DL (ref 6–20)
CALCIUM SERPL-MCNC: 9.3 MG/DL (ref 8.6–10)
CHLORIDE SERPL-SCNC: 98 MMOL/L (ref 98–107)
CREAT SERPL-MCNC: 0.8 MG/DL (ref 0.51–0.95)
CRP SERPL-MCNC: <3 MG/L
DEPRECATED HCO3 PLAS-SCNC: 29 MMOL/L (ref 22–29)
EOSINOPHIL # BLD AUTO: 0.1 10E3/UL (ref 0–0.7)
EOSINOPHIL NFR BLD AUTO: 1 %
ERYTHROCYTE [DISTWIDTH] IN BLOOD BY AUTOMATED COUNT: 12.3 % (ref 10–15)
GFR SERPL CREATININE-BSD FRML MDRD: 86 ML/MIN/1.73M2
GLUCOSE SERPL-MCNC: 93 MG/DL (ref 70–99)
HCT VFR BLD AUTO: 44.8 % (ref 35–47)
HGB BLD-MCNC: 15.3 G/DL (ref 11.7–15.7)
IMM GRANULOCYTES # BLD: 0.2 10E3/UL
IMM GRANULOCYTES NFR BLD: 2 %
LYMPHOCYTES # BLD AUTO: 3.4 10E3/UL (ref 0.8–5.3)
LYMPHOCYTES NFR BLD AUTO: 34 %
MAGNESIUM SERPL-MCNC: 2.2 MG/DL (ref 1.7–2.3)
MCH RBC QN AUTO: 29.7 PG (ref 26.5–33)
MCHC RBC AUTO-ENTMCNC: 34.2 G/DL (ref 31.5–36.5)
MCV RBC AUTO: 87 FL (ref 78–100)
MONOCYTES # BLD AUTO: 0.6 10E3/UL (ref 0–1.3)
MONOCYTES NFR BLD AUTO: 6 %
NEUTROPHILS # BLD AUTO: 5.9 10E3/UL (ref 1.6–8.3)
NEUTROPHILS NFR BLD AUTO: 56 %
NRBC # BLD AUTO: 0 10E3/UL
NRBC BLD AUTO-RTO: 0 /100
PLATELET # BLD AUTO: 298 10E3/UL (ref 150–450)
POTASSIUM SERPL-SCNC: 4 MMOL/L (ref 3.4–5.3)
RBC # BLD AUTO: 5.15 10E6/UL (ref 3.8–5.2)
SODIUM SERPL-SCNC: 138 MMOL/L (ref 136–145)
WBC # BLD AUTO: 10 10E3/UL (ref 4–11)

## 2022-10-21 PROCEDURE — 83735 ASSAY OF MAGNESIUM: CPT | Performed by: EMERGENCY MEDICINE

## 2022-10-21 PROCEDURE — 82310 ASSAY OF CALCIUM: CPT | Performed by: EMERGENCY MEDICINE

## 2022-10-21 PROCEDURE — 86140 C-REACTIVE PROTEIN: CPT | Performed by: EMERGENCY MEDICINE

## 2022-10-21 PROCEDURE — 70486 CT MAXILLOFACIAL W/O DYE: CPT

## 2022-10-21 PROCEDURE — 85025 COMPLETE CBC W/AUTO DIFF WBC: CPT | Performed by: EMERGENCY MEDICINE

## 2022-10-21 PROCEDURE — 99284 EMERGENCY DEPT VISIT MOD MDM: CPT | Mod: 25

## 2022-10-21 PROCEDURE — 36415 COLL VENOUS BLD VENIPUNCTURE: CPT | Performed by: EMERGENCY MEDICINE

## 2022-10-21 RX ORDER — HYDROCODONE BITARTRATE AND ACETAMINOPHEN 5; 325 MG/1; MG/1
1 TABLET ORAL EVERY 6 HOURS PRN
Qty: 10 TABLET | Refills: 0 | Status: SHIPPED | OUTPATIENT
Start: 2022-10-21 | End: 2022-10-24

## 2022-10-21 ASSESSMENT — ENCOUNTER SYMPTOMS
DIZZINESS: 1
NAUSEA: 0
VOMITING: 0

## 2022-10-21 ASSESSMENT — ACTIVITIES OF DAILY LIVING (ADL): ADLS_ACUITY_SCORE: 35

## 2022-10-21 NOTE — ED PROVIDER NOTES
Emergency Department Encounter      NAME: Roxana Lazaro  AGE: 57 year old female  YOB: 1965  MRN: [id number]  EVALUATION DATE & TIME: No admission date for patient encounter.    PCP: Lyly Chaudhry    ED PROVIDER: Elliot Rocha M.D.      Chief Complaint   Patient presents with     Numbness     Right facial swelling and numbness.         FINAL IMPRESSION:  1. Facial pain        MEDICAL DECISION MAKIN:42 PM I met with the patient, obtained history, performed an initial exam, and discussed options and plan for diagnostics and treatment here in the ED. PPE includes surgical mask and gloves.      This patient is a 57-year-old male who says that for the past few weeks she has had right-sided facial pain.  She was seen by Dr. Bourgeois and ENT and prescribed cefuroxime for possible sinusitis.  She says that he planned on getting a facial CT but that this has not been ordered yet.  The patient had some mild tenderness over the right face.  Lab work had been ordered prior to my signout for the patient and her basic metabolic profile, magnesium, CRP and white blood cell count was normal.  I did order a CT scan which showed normal paranasal sinuses.  The patient was given a few Norco for the pain and she will continue her other medications.  She will be following up with her primary care doctor to discuss any further concerns that she has.    Pertinent Labs & Imaging studies reviewed. (See chart for details)           The importance of close follow up was discussed. We reviewed warning signs and symptoms, and I instructed Ms. Angelica Lazaro to return to the emergency department immediately if she develops any new or worsening symptoms. I provided additional verbal discharge instructions. Ms. Angelica Lazaro expressed understanding and agreement with this plan of care, her questions were answered, and she was discharged in stable condition.         MEDICATIONS GIVEN IN THE EMERGENCY:  Medications - No data to  "display    NEW PRESCRIPTIONS STARTED AT TODAY'S ER VISIT:  Discharge Medication List as of 10/21/2022  3:34 PM      START taking these medications    Details   HYDROcodone-acetaminophen (NORCO) 5-325 MG tablet Take 1 tablet by mouth every 6 hours as needed for severe pain, Disp-10 tablet, R-0, Local Print                =================================================================    HPI    Patient information was obtained from: patient     Use of : N/A       Roxana NAPIER Angelica Lazaro is a 57 year old female with a past medical history of anxiety and hyperlipidemia who presents with facial pain.    For the past couple of weeks, the patient has had right sided facial pain. Was seen by Dr. Bourgeois with ENT and they prescribed cefuroxime which she has taken x2 doses. Now, she is feeling \"shaking in her body\", dizziness, and continued right sided facial pain. Thinks she could be dehydrated but usually drinks a lot of water. Denies nausea, vomiting, and any other complaints.      REVIEW OF SYSTEMS   Review of Systems   HENT:        Positive for right sided facial pain.   Gastrointestinal: Negative for nausea and vomiting.   Neurological: Positive for dizziness.        Positive for \"shaking in her body\".   All other systems reviewed and are negative.       PAST MEDICAL HISTORY:  Past Medical History:   Diagnosis Date     Depression      Endometriosis      GERD (gastroesophageal reflux disease)      H. pylori infection     resolved     Hyperlipidemia      Palpitations      Polycystic ovaries      PONV (postoperative nausea and vomiting)      Sleep apnea     pt states \"mild\" no c-pap use       PAST SURGICAL HISTORY:  Past Surgical History:   Procedure Laterality Date     CHOLECYSTECTOMY       DILATION AND CURETTAGE, OPERATIVE HYSTEROSCOPY WITH MORCELLATOR, COMBINED N/A 12/4/2015    Procedure: COMBINED DILATION AND CURETTAGE, OPERATIVE HYSTEROSCOPY WITH MORCELLATOR;  Surgeon: Romelia Coelho MD;  [facility]     " ENDOSCOPY       cheryl morales       left ovarian cystectomy  2003    complicated by wound infection, elevated CA-125, , endometriosis     OPERATIVE HYSTEROSCOPY WITH MORCELLATOR  2013    Procedure: OPERATIVE HYSTEROSCOPY WITH MORCELLATOR;  Hysteroscopy,   Polypectomy with  Myosure, removal of labial skin tag, D  & C;  Surgeon: Romelia Coelho MD;  [facility]       CURRENT MEDICATIONS:    No current facility-administered medications for this encounter.    Current Outpatient Medications:      gabapentin (NEURONTIN) 300 MG capsule, Take 1 capsule (300 mg) by mouth 2 times daily as needed for neuropathic pain, Disp: 60 capsule, Rfl: 0     orphenadrine ER (NORFLEX) 100 MG 12 hr tablet, Take 1 tablet (100 mg) by mouth 2 times daily as needed for muscle spasms, Disp: 14 tablet, Rfl: 0     rosuvastatin (CRESTOR) 5 MG tablet, Take 5 mg by mouth, Disp: , Rfl:     ALLERGIES:  Allergies   Allergen Reactions     Azithromycin Other (See Comments)     Entocort Ec [Budesonide] GI Disturbance     Nausea and vomiting     Sulfa Drugs Rash     Vancomycin Rash     Zantac [Ranitidine] Rash       FAMILY HISTORY:  Family History   Problem Relation Age of Onset     Thyroid Disease Mother 60        thyroid cancer     Breast Cancer Maternal Aunt         and maternal cousin     Gynecology Mother 40        pelvic pain     Gynecology Sister 30        pelvic pain     Alcohol/Drug Sister 37         alcholism     Cancer Other         mat GM's mom ovarian CA     Cancer Maternal Grandmother         pancreatic CA     Breast Cancer Other         told by a paternal aunt a lot of breast CA in their family, do not know details     Breast Cancer Maternal Aunt      Breast Cancer Maternal Cousin        SOCIAL HISTORY:   Social History     Socioeconomic History     Marital status:    Tobacco Use     Smoking status: Former     Packs/day: 1.00     Years: 10.00     Pack years: 10.00     Types: Cigarettes     Smokeless tobacco: Never  "  Substance and Sexual Activity     Alcohol use: Not Currently     Alcohol/week: 0.8 standard drinks     Types: 1 Standard drinks or equivalent per week     Comment: two per month     Drug use: Never     Sexual activity: Yes     Partners: Male         PHYSICAL EXAM:    Vitals: /62   Pulse 100   Temp 98.4  F (36.9  C) (Oral)   Resp 16   Ht 1.651 m (5' 5\")   Wt 77.1 kg (170 lb)   LMP 09/28/2015 (Approximate)   SpO2 100%   BMI 28.29 kg/m     Constitutional: Well developed, well nourished. Comfortable appearing.  HENT: Normocephalic, atraumatic, mucous membranes moist, nose normal. Neck- Supple, gross ROM intact. Mild tenderness to percussion over right maxillary sinus.  Eyes: Pupils mid-range, sclera white, no discharge  Respiratory: Clear to auscultation bilaterally, no respiratory distress, no wheezing, speaks full sentences easily.  Cardiovascular: Normal heart rate, regular rhythm, no murmurs. No lower extremity edema, 2+ DP pulses.   GI: Soft, no tenderness to deep palpation in all quadrants, no masses.  Musculoskeletal: Moving all 4 extremities intentionally and without pain. No obvious deformity.  Skin: Warm, dry, no rash.  Neurologic: Alert & oriented x 3, speech clear, moving all extremities spontaneously   Psychiatric: Affect normal, cooperative.     LAB:  All pertinent labs reviewed and interpreted.  Labs Ordered and Resulted from Time of ED Arrival to Time of ED Departure   BASIC METABOLIC PANEL - Normal       Result Value    Sodium 138      Potassium 4.0      Chloride 98      Carbon Dioxide (CO2) 29      Anion Gap 11      Urea Nitrogen 10.0      Creatinine 0.80      Calcium 9.3      Glucose 93      GFR Estimate 86     MAGNESIUM - Normal    Magnesium 2.2     CRP INFLAMMATION - Normal    CRP Inflammation <3.00     CBC WITH PLATELETS AND DIFFERENTIAL    WBC Count 10.0      RBC Count 5.15      Hemoglobin 15.3      Hematocrit 44.8      MCV 87      MCH 29.7      MCHC 34.2      RDW 12.3      " Platelet Count 298      % Neutrophils 56      % Lymphocytes 34      % Monocytes 6      % Eosinophils 1      % Basophils 1      % Immature Granulocytes 2      NRBCs per 100 WBC 0      Absolute Neutrophils 5.9      Absolute Lymphocytes 3.4      Absolute Monocytes 0.6      Absolute Eosinophils 0.1      Absolute Basophils 0.1      Absolute Immature Granulocytes 0.2      Absolute NRBCs 0.0         RADIOLOGY:  CT Sinus w/o Contrast   Final Result   IMPRESSION:   1.  Normal paranasal sinus CT.            I, Helen Castrejon, am serving as a scribe to document services personally performed by Dr. Elliot Rocha based on my observation and the provider's statements to me. I, Elliot Rocha M.D. attest that Helen Castrejon is acting in a scribe capacity, has observed my performance of the services and has documented them in accordance with my direction.      Elliot Rocha M.D.  Emergency Medicine  [facility] EMERGENCY DEPARTMENT  [facility] [facility] [address]  [phone]  Dept: [phone]       Elliot Rocha MD  10/31/22 2014

## 2022-10-21 NOTE — ED NOTES
Pt to  bed-7 with c/o right sided facial swelling/numbness, assessment completed, pt put on cardiac/bp/o2 monitors, iv started and blood drawn and sent, will continue to monitor. Pt understands plan of care.

## 2022-10-21 NOTE — ED TRIAGE NOTES
"Pain and numbness to right side of face for the last 2 weeks. Pt has also had pain in temple area as well. Pt is on antibiotics since yesterday when she saw an ENT and prednisone since the 13th. Her ENT wants a CT of her sinuses, she can't get in until 11/3/22. Pt feels \"shaky and nervous\"     Triage Assessment     Row Name 10/21/22 1045       Triage Assessment (Adult)    Airway WDL WDL       Respiratory WDL    Respiratory WDL WDL       Skin Circulation/Temperature WDL    Skin Circulation/Temperature WDL WDL       Cardiac WDL    Cardiac WDL WDL       Peripheral/Neurovascular WDL    Peripheral Neurovascular WDL WDL       Cognitive/Neuro/Behavioral WDL    Cognitive/Neuro/Behavioral WDL WDL              "

## 2022-10-23 ENCOUNTER — HEALTH MAINTENANCE LETTER (OUTPATIENT)
Age: 57
End: 2022-10-23

## 2023-04-01 ENCOUNTER — HEALTH MAINTENANCE LETTER (OUTPATIENT)
Age: 58
End: 2023-04-01

## 2023-11-05 ENCOUNTER — HEALTH MAINTENANCE LETTER (OUTPATIENT)
Age: 58
End: 2023-11-05

## 2024-01-02 ENCOUNTER — TRANSFERRED RECORDS (OUTPATIENT)
Dept: HEALTH INFORMATION MANAGEMENT | Facility: CLINIC | Age: 59
End: 2024-01-02
Payer: COMMERCIAL

## 2024-01-19 ENCOUNTER — PRE VISIT (OUTPATIENT)
Dept: ONCOLOGY | Facility: CLINIC | Age: 59
End: 2024-01-19
Payer: COMMERCIAL

## 2024-01-19 ENCOUNTER — TRANSCRIBE ORDERS (OUTPATIENT)
Dept: ONCOLOGY | Facility: CLINIC | Age: 59
End: 2024-01-19
Payer: COMMERCIAL

## 2024-01-19 ENCOUNTER — TRANSFERRED RECORDS (OUTPATIENT)
Dept: HEALTH INFORMATION MANAGEMENT | Facility: CLINIC | Age: 59
End: 2024-01-19
Payer: COMMERCIAL

## 2024-01-19 DIAGNOSIS — N83.201 CYSTS OF BOTH OVARIES: Primary | ICD-10-CM

## 2024-01-19 DIAGNOSIS — N85.8 CYST OF UTERUS: ICD-10-CM

## 2024-01-19 DIAGNOSIS — N83.202 CYSTS OF BOTH OVARIES: Primary | ICD-10-CM

## 2024-01-19 NOTE — TELEPHONE ENCOUNTER
Action Taken  Date/Description  TJ     WT from [facility] January 19, 2024 3:06 PM    Call from Pt to schedule for Dx of Cysts of both ovaries [N83.201, N83.202]  - Primary  Cyst of uterus [N85.8]  Pt is in a lot of pain  Pt Dx'd @ [facility] [place]  Records updated in Care Everywhere? Yes - [facility]  Prior history of Oncologist?  [facility] - Back in [id number] re an abdominal surgery for a lg cyst and she was then diagnosed with endometriosis.  Genetic Testing conducted?  No  Surgical procedures, biopsies? Bx done today at [facility] [place]  Imaging done @:  [facility] [place] and [facility]   Has Pt been anywhere else for this condition/concern?  No  Any scheduled follow up's/imaging/surgical procedures/biopsies?  No

## 2024-01-22 ENCOUNTER — PATIENT OUTREACH (OUTPATIENT)
Dept: ONCOLOGY | Facility: CLINIC | Age: 59
End: 2024-01-22
Payer: COMMERCIAL

## 2024-01-22 NOTE — TELEPHONE ENCOUNTER
RECORDS STATUS - ALL OTHER DIAGNOSIS      Action January 22, 2024 11:32 AM    Action Taken - Request is faxed to [facility] and [facility] records for records.   - Called and lvm for [facility] to push image.   - Request is faxed to [facility] for images.     1:18 PM:   - Received records from [facility]. Path report is still in process. Faxed to [facility] and emailed to [facility].     January 25, 2024 11:28 AM:  - Request path report from [facility].      Imaging Received  January 22, 2024 1:18 PM    Action: Images from [facility] and [facility] received and resolved to PACS.     RECORDS RECEIVED FROM: [facility]   DATE RECEIVED:    NOTES STATUS DETAILS   OFFICE NOTE from referring provider External Self-Referred   OFFICE NOTE from other specialist External: [facility] 1/19/24: Dr. Stephanie Griffiths   DISCHARGE REPORT from the ER CE- [facility]- [facility] 8/29/21: [facility]  8/11/05: [facility] ED   OPERATIVE REPORT Epic 1/19/24: Endometrial Bx  12/4/15: Hysteroscopy  12/20/13: Hysteroscopy   MEDICATION LIST CE- [facility]    LABS     PATHOLOGY REPORTS Report in External: [facility] 12/19/24: In Process   12/4/15: [id number]   12/20/13: [id number]   10/16/13: [id number]    ANYTHING RELATED TO DIAGNOSIS CE- [facility] 1/8/24   IMAGING (NEED IMAGES & REPORT)     CT SCANS (Img req from [facility])  [facility]:  3/19/23: CT Abd Pel   ULTRASOUND (Img req from [facility])  [facility]:  11/10/23: US Pel  1/19/21: US Abd    [facility]:  8/29/21: US Pel

## 2024-01-22 NOTE — PROGRESS NOTES
New Patient Hematology / Oncology Nurse Navigator Note     Referral Date: 1/19/24    Referring provider: Self-referred, patient called.     Referring Clinic/Organization: Self Referred     Referred to: GynOnc    Requested provider (if applicable): Dr. Guallpa    Evaluation for : ovarian cyst     Clinical History (per Nurse review of records provided):    11/10/23 Pelvic US:  Impression  1. The uterus  is normal in appearance.  2. The right ovary was not identified in today's study.  3. The left ovary was noted to have a fluid filled cyst with septations,  5.3 x 4.4 x 4.4 cm size.  It was 3.2 cm in 2021.  Recommend gynecology  consultation for management.  -- BOOKMARKED    Clinical Assessment / Barriers to Care (Per Nurse):  Pt lives in [facility]     Records Location: Care Everywhere     Records Needed:     Need complete records from OBGYN (PAP/HPV, US report, path (biopsy done per pt)) per protocol.     Need Op report/path report from previous surgery at [facility] in [phone] per pt report    Additional testing needed prior to consult:   Pending review of records       Referral updates and Plan:   OUTGOING CALL to pt:  Introduced my role as nurse navigator with [facility] Hematology/Oncology dept and that we have recd the self-referral to Dr. Guallpa.    Pt confirms they are aware of the referral and ready to schedule. Explained we are still working to obtain her records from OBGYN, then we will get her scheduled. Patient confirms she had an Endometrial biopsy done with OBGYN and is awaiting her results.     Provided my direct contact number if future questions arise. Hold placed on Dr. Guallpa's 1/29 schedule [place] [facility] pending review of records.     Valerie Tripp, BSN, RN, PHN, OCN  Hematology/Oncology Nurse Navigator  [facility]  [phone]

## 2024-01-29 ENCOUNTER — ONCOLOGY VISIT (OUTPATIENT)
Dept: ONCOLOGY | Facility: CLINIC | Age: 59
End: 2024-01-29
Attending: OBSTETRICS & GYNECOLOGY
Payer: COMMERCIAL

## 2024-01-29 VITALS
SYSTOLIC BLOOD PRESSURE: 139 MMHG | RESPIRATION RATE: 16 BRPM | TEMPERATURE: 98.3 F | WEIGHT: 175.4 LBS | DIASTOLIC BLOOD PRESSURE: 83 MMHG | BODY MASS INDEX: 29.22 KG/M2 | HEIGHT: 65 IN | HEART RATE: 103 BPM | OXYGEN SATURATION: 97 %

## 2024-01-29 DIAGNOSIS — N83.201 CYSTS OF BOTH OVARIES: Primary | ICD-10-CM

## 2024-01-29 DIAGNOSIS — N95.0 PMB (POSTMENOPAUSAL BLEEDING): ICD-10-CM

## 2024-01-29 DIAGNOSIS — N83.202 CYSTS OF BOTH OVARIES: Primary | ICD-10-CM

## 2024-01-29 PROCEDURE — 99204 OFFICE O/P NEW MOD 45 MIN: CPT | Mod: 57 | Performed by: OBSTETRICS & GYNECOLOGY

## 2024-01-29 PROCEDURE — G0463 HOSPITAL OUTPT CLINIC VISIT: HCPCS | Performed by: OBSTETRICS & GYNECOLOGY

## 2024-01-29 RX ORDER — PRAVASTATIN SODIUM 40 MG
TABLET ORAL
COMMUNITY
Start: 2023-08-18

## 2024-01-29 RX ORDER — SUCRALFATE 1 G/1
TABLET ORAL
COMMUNITY

## 2024-01-29 RX ORDER — IBUPROFEN 200 MG
200-400 TABLET ORAL
Status: ON HOLD | COMMUNITY
End: 2024-02-14

## 2024-01-29 RX ORDER — MAGNESIUM GLYCINATE 100 MG
120 TABLET ORAL
COMMUNITY
Start: 2023-09-07

## 2024-01-29 RX ORDER — ONDANSETRON 4 MG/1
TABLET, ORALLY DISINTEGRATING ORAL
COMMUNITY
Start: 2023-08-04

## 2024-01-29 ASSESSMENT — PAIN SCALES - GENERAL: PAINLEVEL: MILD PAIN (3)

## 2024-01-29 NOTE — PROGRESS NOTES
Consult Notes on Referred Patient    Date: 2024       Dr. Tr Guallpa MD  [address] [place] [id number]       RE: Roxana Lazaro  : 1965  JOHANNA: 2024    Dear Dr. Tr Guallpa:    I had the pleasure of seeing your patient Roxana Lazaro here at the Gynecologic Cancer Clinic at the [facility] on 2024.  As you know she is a very pleasant 58 year old woman with a recent diagnosis of PMB and cystic adnexal mass.  Given these findings she was subsequently sent to the Gynecologic Cancer Clinic for new patient consultation.     11/10/23 Pelvic US:    Impression  1. The uterus  is normal in appearance.  2. The right ovary was not identified in today's study.  3. The left ovary was noted to have a fluid filled cyst with septations,  5.3 x 4.4 x 4.4 cm size.  It was 3.2 cm in .  Recommend gynecology  consultation for management.    She underwent an attempt at EMBx, but this only demonstrated cervical cells (no overt malignancy).    She presents to discuss options        Review of Systems:    Systemic           no weight changes; no fever; no chills; no night sweats; no appetite changes  Skin           no rashes, or lesions  Eye           no irritation; no changes in vision  Radha-Laryngeal           no dysphagia; no hoarseness   Pulmonary    no cough; no shortness of breath  Cardiovascular    no chest pain; no palpitations  Gastrointestinal    no diarrhea; no constipation; no abdominal pain; no changes in bowel  habits; no blood in stool  Genitourinary   no urinary frequency; no urinary urgency; no dysuria; no pain; no abnormal vaginal discharge; no abnormal vaginal bleeding  Breast   no breast discharge; no breast changes; no breast pain  Musculoskeletal    no myalgias; no arthralgias; no back pain  Psychiatric           no depressed mood; no anxiety    Hematologic           no tender lymph nodes; no noticeable swellings or  "lumps   Endocrine    no hot flashes; no heat/cold intolerance         Neurological   no tremor; no numbness and tingling; no headaches; no difficulty  sleeping      Past Medical History:    Past Medical History:   Diagnosis Date    Depression     Endometriosis     GERD (gastroesophageal reflux disease)     H. pylori infection     resolved    Hyperlipidemia     Palpitations     Polycystic ovaries     PONV (postoperative nausea and vomiting)     Sleep apnea     pt states \"mild\" no c-pap use         Past Surgical History:    Past Surgical History:   Procedure Laterality Date    CHOLECYSTECTOMY      DILATION AND CURETTAGE, OPERATIVE HYSTEROSCOPY WITH MORCELLATOR, COMBINED N/A 12/4/2015    Procedure: COMBINED DILATION AND CURETTAGE, OPERATIVE HYSTEROSCOPY WITH MORCELLATOR;  Surgeon: Romelia Coelho MD;  [facility]    ENDOSCOPY      lap choley  1994    left ovarian cystectomy  7/2003    complicated by wound infection, elevated CA-125, , endometriosis    OPERATIVE HYSTEROSCOPY WITH MORCELLATOR  12/20/2013    Procedure: OPERATIVE HYSTEROSCOPY WITH MORCELLATOR;  Hysteroscopy,   Polypectomy with  Myosure, removal of labial skin tag, D  & C;  Surgeon: Romelia Coelho MD;  [facility]         Health Maintenance:  Health Maintenance Due   Topic Date Due    ADVANCE CARE PLANNING  Never done    HEPATITIS B IMMUNIZATION (1 of 3 - 3-dose series) Never done    HIV SCREENING  Never done    HEPATITIS C SCREENING  Never done    ZOSTER IMMUNIZATION (1 of 2) Never done    HPV TEST  10/12/2020    LIPID  10/12/2020    PAP  10/12/2020    YEARLY PREVENTIVE VISIT  05/07/2022    DTAP/TDAP/TD IMMUNIZATION (2 - Td or Tdap) 10/30/2022    LUNG CANCER SCREENING  03/23/2023    COVID-19 Vaccine (5 - 2023-24 season) 09/01/2023    PHQ-2 (once per calendar year)  Never done       Last Pap Smear: 2019              Result: NILM (No HPV); no history of abnormal paps        Current Medications:     has a current medication list which includes " "the following prescription(s): gabapentin, orphenadrine er, and rosuvastatin.       Allergies:     Azithromycin, Entocort ec [budesonide], Sulfa antibiotics, Vancomycin, and Zantac [ranitidine]        Social History:     Social History     Tobacco Use    Smoking status: Former     Packs/day: 1.00     Years: 10.00     Additional pack years: 0.00     Total pack years: 10.00     Types: Cigarettes    Smokeless tobacco: Never   Substance Use Topics    Alcohol use: Not Currently     Alcohol/week: 0.8 standard drinks of alcohol     Types: 1 Standard drinks or equivalent per week     Comment: two per month       History   Drug Use Unknown           Family History:     The patient's family history is notable for .    Family History   Problem Relation Age of Onset    Thyroid Disease Mother 60        thyroid cancer    Breast Cancer Maternal Aunt         and maternal cousin    Gynecology Mother 40        pelvic pain    Gynecology Sister 30        pelvic pain    Alcohol/Drug Sister 37         alcholism    Cancer Other         mat GM's mom ovarian CA    Cancer Maternal Grandmother         pancreatic CA    Breast Cancer Other         told by a paternal aunt a lot of breast CA in their family, do not know details    Breast Cancer Maternal Aunt     Breast Cancer Maternal Cousin          Physical Exam:     PS0  VS: /83 (BP Location: Right arm, Patient Position: Sitting, Cuff Size: Adult Regular)   Pulse 103   Temp 98.3  F (36.8  C) (Oral)   Resp 16   Ht 1.645 m (5' 4.76\")   Wt 79.6 kg (175 lb 6.4 oz)   LMP 2015 (Approximate)   SpO2 97%   BMI 29.40 kg/m       General Appearance: healthy and alert, no distress     HEENT:  no thyromegaly, no palpable nodules or masses        Cardiovascular: regular rate and rhythm, no gallops, rubs or murmurs     Respiratory: lungs clear, no rales, rhonchi or wheezes, normal diaphragmatic excursion    Musculoskeletal: extremities non tender and without edema    Skin: no lesions " or rashes     Neurological: normal gait, no gross defects     Psychiatric: appropriate mood and affect                               Hematological: normal cervical, supraclavicular and inguinal lymph nodes     Gastrointestinal:       abdomen soft, non-tender, non-distended, no organomegaly or masses    Genitourinary: External genitalia and urethral meatus appears normal.  Vagina is smooth without nodularity or masses.  Cervix appears normal and without lesions but the os does not admit a sound.  Bimanual exam reveal no masses, nodularity or fullness.  Recto-vaginal exam confirms these findings.      Assessment:    Roxana Lazaro is a 58 year old woman with a new diagnosis of PMB with inability to access endometrium, and small adnexal mass.     A total of 45 minutes was spent with the patient, 40 minutes of which were spent in counseling the patient and/or treatment planning.      Plan:     1.)   PMB: we have discussed options from observation through TLH/BSO.  I have recommended an endometrial biopsy and ECC to exclude abnormal pathology potentially followed by TLH/BSO or observation depending on the findings and/or persistence of symptoms.  She is inclined to hysterectomy which is reasonable.  Will arrange for US guidance     2.) Genetic risk factors were assessed and the patient does not meet the qualifications for a referral.      3.) Labs and/or tests ordered include:  pre-op labs.     4.) Health maintenance issues addressed today include none.    5.) Pre-op teaching was completed today.  Risks of surgery were discussed to include: bleeding, transfusion, infection, unintentional injury to surrounding organs/structures.      Thank you for allowing us to participate in the care of your patient.         Sincerely,    Tr Guallpa MD    CC  Patient Care Team:  Lyly Chaudhry NP as PCP - General (Family Medicine)  Romelia Coelho MD as MD (OB/Gyn)  Romelia Coelho MD as Referring Physician  (OB/Gyn)  Lyly Chaudhry, NP as PCP (Family Practice)  Joanne Jones MD as MD (Urology)  Shahnaz Portillo MD as MD (Family Practice)  Elliot Solomon MD as MD (Internal Medicine)  Vanessa Wong MD as MD (Cardiology)  Priya Guallpa MD as MD (Gynecologic Oncology)  PRIYA GUALLPA

## 2024-01-29 NOTE — PATIENT INSTRUCTIONS
It was a pleasure seeing you in clinic today-please reach out if there are any further questions that arise following today's visit.  During business hours, you may reach me at [phone].  For urgent/emergent questions after business hours, you may reach the on-call Gynecologic Oncology Resident through the [facility]  at [phone].    All normal test results are usually communicated via letter or [employer] message.  Abnormal results (those that require a change in the previously discussed plan of care) are usually communicated via a phone call.    I recommend signing up for [employer] access if you have not already done so.  This allows you online access to your lab results and also helps you communicate efficiently with your clinic should any questions arise in your care.        You will be/have been scheduled for surgery     Diagnosis:      endometrial cysts    The surgical procedure is: d and c    Anticipated length of surgery: .  You will be in the recovery room for approximately1 hrs after surgery.      You will be going home the same day.  At discharge you will need a someone to drive you home.  You will need someone to stay with you for the first 24 hours you are home.    Preparation for Surgery:    To Schedule      no need for     preoperative clearance for surgery             *  No solid foods or milk products 10 hours before your surgery time, you can  have clear liquids up until 4 hours before your surgery time.    Postoperative Restrictions:                 nothing in the vagina (no tampons, no intercourse, no douching) for two weeks.     Postoperative plan  drink at least 6-8 glasses of water per day, there are no dietary restrictions.     Activity as tolerated  You may shower the day after surgery   You will be given ibuprofen and tylenol.  Do not set an alarm to wake yourself up to take the pain medications.    do not take this on a schedule.      You might have some cramping  and vaginal discharge/drainage for a few days to a week.      Call clinic if you have fever greater than 100.5, heavy vaginal bleeding, persistent nausea/vomiting,  drainage with bad odor or other concerns.      ostoperative visit:  Return to clinic 1-2 weeks after surgery for post operative visit.  Will discuss your pathology results and any needed follow up plan at that visit.      Please contact the clinic with any questions or concerns.      Dr Saloni Fletcher RN  Phone:  [phone]  Fax:  [phone]

## 2024-01-29 NOTE — LETTER
2024         RE: Roxana Lazaro  [address]  [place] [address]        Dear Colleague,    Thank you for referring your patient, Roxana Lazaro, to the [facility]. Please see a copy of my visit note below.                            Consult Notes on Referred Patient    Date: 2024       Dr. Tr Guallpa MD  [address] [place] [address]       RE: Roxana Lazaro  : 1965  JOHANNA: 2024    Dear Dr. Tr Guallpa:    I had the pleasure of seeing your patient Roxana Lazaro here at the Gynecologic Cancer Clinic at the [facility] on 2024.  As you know she is a very pleasant 58 year old woman with a recent diagnosis of PMB and cystic adnexal mass.  Given these findings she was subsequently sent to the Gynecologic Cancer Clinic for new patient consultation.     11/10/23 Pelvic US:    Impression  1. The uterus  is normal in appearance.  2. The right ovary was not identified in today's study.  3. The left ovary was noted to have a fluid filled cyst with septations,  5.3 x 4.4 x 4.4 cm size.  It was 3.2 cm in .  Recommend gynecology  consultation for management.    She underwent an attempt at EMBx, but this only demonstrated cervical cells (no overt malignancy).    She presents to discuss options        Review of Systems:    Systemic           no weight changes; no fever; no chills; no night sweats; no appetite changes  Skin           no rashes, or lesions  Eye           no irritation; no changes in vision  Radha-Laryngeal           no dysphagia; no hoarseness   Pulmonary    no cough; no shortness of breath  Cardiovascular    no chest pain; no palpitations  Gastrointestinal    no diarrhea; no constipation; no abdominal pain; no changes in bowel  habits; no blood in stool  Genitourinary   no urinary frequency; no urinary urgency; no dysuria; no pain; no abnormal vaginal discharge; no abnormal vaginal  "bleeding  Breast   no breast discharge; no breast changes; no breast pain  Musculoskeletal    no myalgias; no arthralgias; no back pain  Psychiatric           no depressed mood; no anxiety    Hematologic           no tender lymph nodes; no noticeable swellings or lumps   Endocrine    no hot flashes; no heat/cold intolerance         Neurological   no tremor; no numbness and tingling; no headaches; no difficulty  sleeping      Past Medical History:    Past Medical History:   Diagnosis Date    Depression     Endometriosis     GERD (gastroesophageal reflux disease)     H. pylori infection     resolved    Hyperlipidemia     Palpitations     Polycystic ovaries     PONV (postoperative nausea and vomiting)     Sleep apnea     pt states \"mild\" no c-pap use         Past Surgical History:    Past Surgical History:   Procedure Laterality Date    CHOLECYSTECTOMY      DILATION AND CURETTAGE, OPERATIVE HYSTEROSCOPY WITH MORCELLATOR, COMBINED N/A 12/4/2015    Procedure: COMBINED DILATION AND CURETTAGE, OPERATIVE HYSTEROSCOPY WITH MORCELLATOR;  Surgeon: Romelia Coelho MD;  [facility]    ENDOSCOPY      lap choley  1994    left ovarian cystectomy  7/2003    complicated by wound infection, elevated CA-125, , endometriosis    OPERATIVE HYSTEROSCOPY WITH MORCELLATOR  12/20/2013    Procedure: OPERATIVE HYSTEROSCOPY WITH MORCELLATOR;  Hysteroscopy,   Polypectomy with  Myosure, removal of labial skin tag, D  & C;  Surgeon: Romelia Coelho MD;  [facility]         Health Maintenance:  Health Maintenance Due   Topic Date Due    ADVANCE CARE PLANNING  Never done    HEPATITIS B IMMUNIZATION (1 of 3 - 3-dose series) Never done    HIV SCREENING  Never done    HEPATITIS C SCREENING  Never done    ZOSTER IMMUNIZATION (1 of 2) Never done    HPV TEST  10/12/2020    LIPID  10/12/2020    PAP  10/12/2020    YEARLY PREVENTIVE VISIT  05/07/2022    DTAP/TDAP/TD IMMUNIZATION (2 - Td or Tdap) 10/30/2022    LUNG CANCER SCREENING  03/23/2023    " "COVID-19 Vaccine ( season) 2023    PHQ-2 (once per calendar year)  Never done       Last Pap Smear: 2019              Result: NILM (No HPV); no history of abnormal paps        Current Medications:     has a current medication list which includes the following prescription(s): gabapentin, orphenadrine er, and rosuvastatin.       Allergies:     Azithromycin, Entocort ec [budesonide], Sulfa antibiotics, Vancomycin, and Zantac [ranitidine]        Social History:     Social History     Tobacco Use    Smoking status: Former     Packs/day: 1.00     Years: 10.00     Additional pack years: 0.00     Total pack years: 10.00     Types: Cigarettes    Smokeless tobacco: Never   Substance Use Topics    Alcohol use: Not Currently     Alcohol/week: 0.8 standard drinks of alcohol     Types: 1 Standard drinks or equivalent per week     Comment: two per month       History   Drug Use Unknown           Family History:     The patient's family history is notable for .    Family History   Problem Relation Age of Onset    Thyroid Disease Mother 60        thyroid cancer    Breast Cancer Maternal Aunt         and maternal cousin    Gynecology Mother 40        pelvic pain    Gynecology Sister 30        pelvic pain    Alcohol/Drug Sister 37         alcholism    Cancer Other         mat GM's mom ovarian CA    Cancer Maternal Grandmother         pancreatic CA    Breast Cancer Other         told by a paternal aunt a lot of breast CA in their family, do not know details    Breast Cancer Maternal Aunt     Breast Cancer Maternal Cousin          Physical Exam:     PS0  VS: /83 (BP Location: Right arm, Patient Position: Sitting, Cuff Size: Adult Regular)   Pulse 103   Temp 98.3  F (36.8  C) (Oral)   Resp 16   Ht 1.645 m (5' 4.76\")   Wt 79.6 kg (175 lb 6.4 oz)   LMP 2015 (Approximate)   SpO2 97%   BMI 29.40 kg/m       General Appearance: healthy and alert, no distress     HEENT:  no thyromegaly, no palpable " nodules or masses        Cardiovascular: regular rate and rhythm, no gallops, rubs or murmurs     Respiratory: lungs clear, no rales, rhonchi or wheezes, normal diaphragmatic excursion    Musculoskeletal: extremities non tender and without edema    Skin: no lesions or rashes     Neurological: normal gait, no gross defects     Psychiatric: appropriate mood and affect                               Hematological: normal cervical, supraclavicular and inguinal lymph nodes     Gastrointestinal:       abdomen soft, non-tender, non-distended, no organomegaly or masses    Genitourinary: External genitalia and urethral meatus appears normal.  Vagina is smooth without nodularity or masses.  Cervix appears normal and without lesions but the os does not admit a sound.  Bimanual exam reveal no masses, nodularity or fullness.  Recto-vaginal exam confirms these findings.      Assessment:    Roxana Lazaro is a 58 year old woman with a new diagnosis of PMB with inability to access endometrium, and small adnexal mass.     A total of 45 minutes was spent with the patient, 40 minutes of which were spent in counseling the patient and/or treatment planning.      Plan:     1.)   PMB: we have discussed options from observation through TLH/BSO.  I have recommended an endometrial biopsy and ECC to exclude abnormal pathology potentially followed by TLH/BSO or observation depending on the findings and/or persistence of symptoms.  She is inclined to hysterectomy which is reasonable.  Will arrange for US guidance     2.) Genetic risk factors were assessed and the patient does not meet the qualifications for a referral.      3.) Labs and/or tests ordered include:  pre-op labs.     4.) Health maintenance issues addressed today include none.    5.) Pre-op teaching was completed today.  Risks of surgery were discussed to include: bleeding, transfusion, infection, unintentional injury to surrounding organs/structures.      Thank you for allowing  us to participate in the care of your patient.         Sincerely,    Tr Guallpa MD    CC  Patient Care Team:  Lyly Chaudhry, ISH as PCP - General (Family Medicine)

## 2024-01-29 NOTE — NURSING NOTE
"Oncology Rooming Note    January 29, 2024 11:59 AM   Roxana Lazaro is a 58 year old female who presents for:    Chief Complaint   Patient presents with    Oncology Clinic Visit     Cysts of both ovaries; Cyst of uterus     Initial Vitals: /83 (BP Location: Right arm, Patient Position: Sitting, Cuff Size: Adult Regular)   Pulse 103   Temp 98.3  F (36.8  C) (Oral)   Resp 16   Ht 1.645 m (5' 4.76\")   Wt 79.6 kg (175 lb 6.4 oz)   LMP 09/28/2015 (Approximate)   SpO2 97%   BMI 29.40 kg/m   Estimated body mass index is 29.4 kg/m  as calculated from the following:    Height as of this encounter: 1.645 m (5' 4.76\").    Weight as of this encounter: 79.6 kg (175 lb 6.4 oz). Body surface area is 1.91 meters squared.  Mild Pain (3) Comment: discomfort   Patient's last menstrual period was 09/28/2015 (approximate).  Allergies reviewed: Yes  Medications reviewed: Yes    Medications: Medication refills not needed today.  Pharmacy name entered into [employer]: [facility] [place], [place]    Frailty Screening:   Is the patient here for a new oncology consult visit in cancer care? 2. No      Clinical concerns: none       Mercedes Romo              "

## 2024-01-30 ENCOUNTER — PATIENT OUTREACH (OUTPATIENT)
Dept: ONCOLOGY | Facility: CLINIC | Age: 59
End: 2024-01-30
Payer: COMMERCIAL

## 2024-01-31 NOTE — PROGRESS NOTES
Spoke with pt   Discussed potential surgery next week and future hysterctomy plans  Will reach out to [facility]  on for confirmation of date

## 2024-02-02 ENCOUNTER — PATIENT OUTREACH (OUTPATIENT)
Dept: ONCOLOGY | Facility: CLINIC | Age: 59
End: 2024-02-02

## 2024-02-02 NOTE — PROGRESS NOTES
Spoke with pt   Explained that I do not see any appts set up either  I will have to reach out to the surgery scheduler to find out the status  Unknown if 2/6 is still an option or if there was an issue with the date  Will call back Monday

## 2024-02-02 NOTE — PROGRESS NOTES
426  Roxana gomez  [phone]  Don't see time for appt on Tuesday  Ok to lm on phone # or send my chart  Clarify if general anesthesia

## 2024-02-06 DIAGNOSIS — N85.8 CYST OF UTERUS: Primary | ICD-10-CM

## 2024-02-06 RX ORDER — HEPARIN SODIUM 5000 [USP'U]/.5ML
5000 INJECTION, SOLUTION INTRAVENOUS; SUBCUTANEOUS
Status: CANCELLED | OUTPATIENT
Start: 2024-02-06

## 2024-02-06 RX ORDER — CEFAZOLIN SODIUM 2 G/50ML
2 SOLUTION INTRAVENOUS
Status: CANCELLED | OUTPATIENT
Start: 2024-02-06

## 2024-02-06 RX ORDER — CEFAZOLIN SODIUM 2 G/50ML
2 SOLUTION INTRAVENOUS SEE ADMIN INSTRUCTIONS
Status: CANCELLED | OUTPATIENT
Start: 2024-02-06

## 2024-02-07 ENCOUNTER — TELEPHONE (OUTPATIENT)
Dept: ONCOLOGY | Facility: CLINIC | Age: 59
End: 2024-02-07
Payer: COMMERCIAL

## 2024-02-07 NOTE — TELEPHONE ENCOUNTER
Patient is schedule for surgery with: Dr. Guallpa    Surgery Date: 2/14     Location: [facility]    H&P: already completed by surgeon will complete and/or update on day of surgery as needed      Post-op: will be scheduled by the clinic    Patient will receive a phone call from pre-admission nurses 1-2 days prior to surgery with arrival time and NPO instructions.    Patient aware times are subject to change up until day before surgery.     Patient questions/concerns: Patient mentioned that she wanted general anesthesia, it shows patient choice. She was wondering if there was anything that she would need to avoid prior to going  under general. Can you please reach out to her. Thank you.     Surgery packet was provided to patient during appointment      Verito Jacome on 2/7/2024 at 11:24 AM

## 2024-02-14 ENCOUNTER — APPOINTMENT (OUTPATIENT)
Dept: ULTRASOUND IMAGING | Facility: CLINIC | Age: 59
End: 2024-02-14
Attending: OBSTETRICS & GYNECOLOGY
Payer: COMMERCIAL

## 2024-02-14 ENCOUNTER — ANESTHESIA EVENT (OUTPATIENT)
Dept: SURGERY | Facility: CLINIC | Age: 59
End: 2024-02-14
Payer: COMMERCIAL

## 2024-02-14 ENCOUNTER — HOSPITAL ENCOUNTER (OUTPATIENT)
Facility: CLINIC | Age: 59
Discharge: HOME | End: 2024-02-14
Attending: OBSTETRICS & GYNECOLOGY | Admitting: OBSTETRICS & GYNECOLOGY
Payer: COMMERCIAL

## 2024-02-14 ENCOUNTER — ANESTHESIA (OUTPATIENT)
Dept: SURGERY | Facility: CLINIC | Age: 59
End: 2024-02-14
Payer: COMMERCIAL

## 2024-02-14 VITALS
HEIGHT: 65 IN | DIASTOLIC BLOOD PRESSURE: 72 MMHG | HEART RATE: 90 BPM | OXYGEN SATURATION: 95 % | SYSTOLIC BLOOD PRESSURE: 130 MMHG | BODY MASS INDEX: 28.72 KG/M2 | TEMPERATURE: 97.8 F | RESPIRATION RATE: 16 BRPM | WEIGHT: 172.4 LBS

## 2024-02-14 DIAGNOSIS — G89.18 POST-OP PAIN: Primary | ICD-10-CM

## 2024-02-14 DIAGNOSIS — N85.8 CYST OF UTERUS: ICD-10-CM

## 2024-02-14 PROCEDURE — 370N000017 HC ANESTHESIA TECHNICAL FEE, PER MIN: Performed by: OBSTETRICS & GYNECOLOGY

## 2024-02-14 PROCEDURE — 88305 TISSUE EXAM BY PATHOLOGIST: CPT | Mod: TC | Performed by: OBSTETRICS & GYNECOLOGY

## 2024-02-14 PROCEDURE — 250N000011 HC RX IP 250 OP 636: Performed by: ANESTHESIOLOGY

## 2024-02-14 PROCEDURE — 88305 TISSUE EXAM BY PATHOLOGIST: CPT | Mod: 26 | Performed by: PATHOLOGY

## 2024-02-14 PROCEDURE — 250N000011 HC RX IP 250 OP 636: Performed by: NURSE ANESTHETIST, CERTIFIED REGISTERED

## 2024-02-14 PROCEDURE — 250N000011 HC RX IP 250 OP 636: Performed by: OBSTETRICS & GYNECOLOGY

## 2024-02-14 PROCEDURE — 710N000012 HC RECOVERY PHASE 2, PER MINUTE: Performed by: OBSTETRICS & GYNECOLOGY

## 2024-02-14 PROCEDURE — 76857 US EXAM PELVIC LIMITED: CPT | Mod: TC

## 2024-02-14 PROCEDURE — 360N000075 HC SURGERY LEVEL 2, PER MIN: Performed by: OBSTETRICS & GYNECOLOGY

## 2024-02-14 PROCEDURE — 258N000003 HC RX IP 258 OP 636: Performed by: NURSE ANESTHETIST, CERTIFIED REGISTERED

## 2024-02-14 PROCEDURE — 710N000010 HC RECOVERY PHASE 1, LEVEL 2, PER MIN: Performed by: OBSTETRICS & GYNECOLOGY

## 2024-02-14 PROCEDURE — 999N000141 HC STATISTIC PRE-PROCEDURE NURSING ASSESSMENT: Performed by: OBSTETRICS & GYNECOLOGY

## 2024-02-14 PROCEDURE — 272N000001 HC OR GENERAL SUPPLY STERILE: Performed by: OBSTETRICS & GYNECOLOGY

## 2024-02-14 PROCEDURE — 250N000009 HC RX 250: Performed by: NURSE ANESTHETIST, CERTIFIED REGISTERED

## 2024-02-14 RX ORDER — ONDANSETRON 4 MG/1
4 TABLET, ORALLY DISINTEGRATING ORAL EVERY 30 MIN PRN
Status: CANCELLED | OUTPATIENT
Start: 2024-02-14

## 2024-02-14 RX ORDER — LORAZEPAM 2 MG/ML
.5-1 INJECTION INTRAMUSCULAR
Status: DISCONTINUED | OUTPATIENT
Start: 2024-02-14 | End: 2024-02-14 | Stop reason: HOSPADM

## 2024-02-14 RX ORDER — ONDANSETRON 2 MG/ML
4 INJECTION INTRAMUSCULAR; INTRAVENOUS EVERY 30 MIN PRN
Status: CANCELLED | OUTPATIENT
Start: 2024-02-14

## 2024-02-14 RX ORDER — FENTANYL CITRATE 50 UG/ML
25 INJECTION, SOLUTION INTRAMUSCULAR; INTRAVENOUS EVERY 5 MIN PRN
Status: DISCONTINUED | OUTPATIENT
Start: 2024-02-14 | End: 2024-02-14 | Stop reason: HOSPADM

## 2024-02-14 RX ORDER — MEPERIDINE HYDROCHLORIDE 25 MG/ML
12.5 INJECTION INTRAMUSCULAR; INTRAVENOUS; SUBCUTANEOUS EVERY 5 MIN PRN
Status: DISCONTINUED | OUTPATIENT
Start: 2024-02-14 | End: 2024-02-14 | Stop reason: HOSPADM

## 2024-02-14 RX ORDER — ONDANSETRON 4 MG/1
4 TABLET, ORALLY DISINTEGRATING ORAL EVERY 30 MIN PRN
Status: DISCONTINUED | OUTPATIENT
Start: 2024-02-14 | End: 2024-02-14 | Stop reason: HOSPADM

## 2024-02-14 RX ORDER — PROPOFOL 10 MG/ML
INJECTION, EMULSION INTRAVENOUS CONTINUOUS PRN
Status: DISCONTINUED | OUTPATIENT
Start: 2024-02-14 | End: 2024-02-14

## 2024-02-14 RX ORDER — FENTANYL CITRATE 50 UG/ML
50 INJECTION, SOLUTION INTRAMUSCULAR; INTRAVENOUS EVERY 5 MIN PRN
Status: DISCONTINUED | OUTPATIENT
Start: 2024-02-14 | End: 2024-02-14 | Stop reason: HOSPADM

## 2024-02-14 RX ORDER — ACETAMINOPHEN 325 MG/1
975 TABLET ORAL ONCE
Status: DISCONTINUED | OUTPATIENT
Start: 2024-02-14 | End: 2024-02-14 | Stop reason: HOSPADM

## 2024-02-14 RX ORDER — IBUPROFEN 600 MG/1
600 TABLET, FILM COATED ORAL EVERY 6 HOURS PRN
Qty: 12 TABLET | Refills: 0 | Status: SHIPPED | OUTPATIENT
Start: 2024-02-14

## 2024-02-14 RX ORDER — HYDROMORPHONE HCL IN WATER/PF 6 MG/30 ML
0.4 PATIENT CONTROLLED ANALGESIA SYRINGE INTRAVENOUS EVERY 5 MIN PRN
Status: DISCONTINUED | OUTPATIENT
Start: 2024-02-14 | End: 2024-02-14 | Stop reason: HOSPADM

## 2024-02-14 RX ORDER — BUPIVACAINE HYDROCHLORIDE 5 MG/ML
INJECTION, SOLUTION EPIDURAL; INTRACAUDAL PRN
Status: DISCONTINUED | OUTPATIENT
Start: 2024-02-14 | End: 2024-02-14 | Stop reason: HOSPADM

## 2024-02-14 RX ORDER — HALOPERIDOL 5 MG/ML
1 INJECTION INTRAMUSCULAR
Status: DISCONTINUED | OUTPATIENT
Start: 2024-02-14 | End: 2024-02-14 | Stop reason: HOSPADM

## 2024-02-14 RX ORDER — KETOROLAC TROMETHAMINE 30 MG/ML
INJECTION, SOLUTION INTRAMUSCULAR; INTRAVENOUS PRN
Status: DISCONTINUED | OUTPATIENT
Start: 2024-02-14 | End: 2024-02-14

## 2024-02-14 RX ORDER — OXYCODONE HYDROCHLORIDE 10 MG/1
10 TABLET ORAL
Status: CANCELLED | OUTPATIENT
Start: 2024-02-14

## 2024-02-14 RX ORDER — ACETAMINOPHEN 325 MG/1
975 TABLET ORAL
Status: DISCONTINUED | OUTPATIENT
Start: 2024-02-14 | End: 2024-02-14 | Stop reason: HOSPADM

## 2024-02-14 RX ORDER — HEPARIN SODIUM 5000 [USP'U]/.5ML
5000 INJECTION, SOLUTION INTRAVENOUS; SUBCUTANEOUS
Status: COMPLETED | OUTPATIENT
Start: 2024-02-14 | End: 2024-02-14

## 2024-02-14 RX ORDER — PROPOFOL 10 MG/ML
INJECTION, EMULSION INTRAVENOUS PRN
Status: DISCONTINUED | OUTPATIENT
Start: 2024-02-14 | End: 2024-02-14

## 2024-02-14 RX ORDER — OXYCODONE HYDROCHLORIDE 5 MG/1
5 TABLET ORAL
Status: CANCELLED | OUTPATIENT
Start: 2024-02-14

## 2024-02-14 RX ORDER — ONDANSETRON 2 MG/ML
INJECTION INTRAMUSCULAR; INTRAVENOUS PRN
Status: DISCONTINUED | OUTPATIENT
Start: 2024-02-14 | End: 2024-02-14

## 2024-02-14 RX ORDER — LIDOCAINE 40 MG/G
CREAM TOPICAL
Status: DISCONTINUED | OUTPATIENT
Start: 2024-02-14 | End: 2024-02-14 | Stop reason: HOSPADM

## 2024-02-14 RX ORDER — ACETAMINOPHEN 325 MG/1
650 TABLET ORAL EVERY 6 HOURS PRN
Qty: 24 TABLET | Refills: 0 | Status: SHIPPED | OUTPATIENT
Start: 2024-02-14

## 2024-02-14 RX ORDER — SODIUM CHLORIDE, SODIUM LACTATE, POTASSIUM CHLORIDE, CALCIUM CHLORIDE 600; 310; 30; 20 MG/100ML; MG/100ML; MG/100ML; MG/100ML
INJECTION, SOLUTION INTRAVENOUS CONTINUOUS
Status: DISCONTINUED | OUTPATIENT
Start: 2024-02-14 | End: 2024-02-14 | Stop reason: HOSPADM

## 2024-02-14 RX ORDER — SODIUM CHLORIDE, SODIUM LACTATE, POTASSIUM CHLORIDE, CALCIUM CHLORIDE 600; 310; 30; 20 MG/100ML; MG/100ML; MG/100ML; MG/100ML
INJECTION, SOLUTION INTRAVENOUS CONTINUOUS PRN
Status: DISCONTINUED | OUTPATIENT
Start: 2024-02-14 | End: 2024-02-14

## 2024-02-14 RX ORDER — SODIUM CHLORIDE, SODIUM LACTATE, POTASSIUM CHLORIDE, CALCIUM CHLORIDE 600; 310; 30; 20 MG/100ML; MG/100ML; MG/100ML; MG/100ML
500 INJECTION, SOLUTION INTRAVENOUS CONTINUOUS
Status: DISCONTINUED | OUTPATIENT
Start: 2024-02-14 | End: 2024-02-14 | Stop reason: HOSPADM

## 2024-02-14 RX ORDER — IBUPROFEN 200 MG
800 TABLET ORAL ONCE
Status: DISCONTINUED | OUTPATIENT
Start: 2024-02-14 | End: 2024-02-14 | Stop reason: HOSPADM

## 2024-02-14 RX ORDER — CEFAZOLIN SODIUM/WATER 2 G/20 ML
2 SYRINGE (ML) INTRAVENOUS
Status: COMPLETED | OUTPATIENT
Start: 2024-02-14 | End: 2024-02-14

## 2024-02-14 RX ORDER — LIDOCAINE HYDROCHLORIDE 20 MG/ML
INJECTION, SOLUTION INFILTRATION; PERINEURAL PRN
Status: DISCONTINUED | OUTPATIENT
Start: 2024-02-14 | End: 2024-02-14

## 2024-02-14 RX ORDER — FENTANYL CITRATE 50 UG/ML
INJECTION, SOLUTION INTRAMUSCULAR; INTRAVENOUS PRN
Status: DISCONTINUED | OUTPATIENT
Start: 2024-02-14 | End: 2024-02-14

## 2024-02-14 RX ORDER — CEFAZOLIN SODIUM/WATER 2 G/20 ML
2 SYRINGE (ML) INTRAVENOUS SEE ADMIN INSTRUCTIONS
Status: DISCONTINUED | OUTPATIENT
Start: 2024-02-14 | End: 2024-02-14 | Stop reason: HOSPADM

## 2024-02-14 RX ORDER — DEXAMETHASONE SODIUM PHOSPHATE 4 MG/ML
INJECTION, SOLUTION INTRA-ARTICULAR; INTRALESIONAL; INTRAMUSCULAR; INTRAVENOUS; SOFT TISSUE PRN
Status: DISCONTINUED | OUTPATIENT
Start: 2024-02-14 | End: 2024-02-14

## 2024-02-14 RX ORDER — ALBUTEROL SULFATE 0.83 MG/ML
2.5 SOLUTION RESPIRATORY (INHALATION) EVERY 4 HOURS PRN
Status: DISCONTINUED | OUTPATIENT
Start: 2024-02-14 | End: 2024-02-14 | Stop reason: HOSPADM

## 2024-02-14 RX ORDER — HYDROMORPHONE HCL IN WATER/PF 6 MG/30 ML
0.2 PATIENT CONTROLLED ANALGESIA SYRINGE INTRAVENOUS EVERY 5 MIN PRN
Status: DISCONTINUED | OUTPATIENT
Start: 2024-02-14 | End: 2024-02-14 | Stop reason: HOSPADM

## 2024-02-14 RX ORDER — ONDANSETRON 2 MG/ML
4 INJECTION INTRAMUSCULAR; INTRAVENOUS EVERY 30 MIN PRN
Status: DISCONTINUED | OUTPATIENT
Start: 2024-02-14 | End: 2024-02-14 | Stop reason: HOSPADM

## 2024-02-14 RX ADMIN — PROPOFOL 40 MG: 10 INJECTION, EMULSION INTRAVENOUS at 07:53

## 2024-02-14 RX ADMIN — SUCCINYLCHOLINE CHLORIDE 80 MG: 20 INJECTION, SOLUTION INTRAMUSCULAR; INTRAVENOUS; PARENTERAL at 07:47

## 2024-02-14 RX ADMIN — KETOROLAC TROMETHAMINE 30 MG: 30 INJECTION, SOLUTION INTRAMUSCULAR at 07:58

## 2024-02-14 RX ADMIN — MIDAZOLAM 0.5 MG: 1 INJECTION INTRAMUSCULAR; INTRAVENOUS at 07:41

## 2024-02-14 RX ADMIN — ONDANSETRON 4 MG: 2 INJECTION INTRAMUSCULAR; INTRAVENOUS at 08:03

## 2024-02-14 RX ADMIN — LIDOCAINE HYDROCHLORIDE 100 MG: 20 INJECTION, SOLUTION INFILTRATION; PERINEURAL at 07:47

## 2024-02-14 RX ADMIN — SODIUM CHLORIDE, POTASSIUM CHLORIDE, SODIUM LACTATE AND CALCIUM CHLORIDE: 600; 310; 30; 20 INJECTION, SOLUTION INTRAVENOUS at 07:38

## 2024-02-14 RX ADMIN — PROPOFOL 150 MG: 10 INJECTION, EMULSION INTRAVENOUS at 07:47

## 2024-02-14 RX ADMIN — FENTANYL CITRATE 25 MCG: 50 INJECTION, SOLUTION INTRAMUSCULAR; INTRAVENOUS at 08:24

## 2024-02-14 RX ADMIN — SUGAMMADEX 50 MG: 100 INJECTION, SOLUTION INTRAVENOUS at 08:02

## 2024-02-14 RX ADMIN — MIDAZOLAM 0.5 MG: 1 INJECTION INTRAMUSCULAR; INTRAVENOUS at 07:38

## 2024-02-14 RX ADMIN — Medication 20 MG: at 07:50

## 2024-02-14 RX ADMIN — PROPOFOL 200 MCG/KG/MIN: 10 INJECTION, EMULSION INTRAVENOUS at 07:47

## 2024-02-14 RX ADMIN — Medication 2 G: at 07:38

## 2024-02-14 RX ADMIN — HEPARIN SODIUM 5000 UNITS: 5000 INJECTION, SOLUTION INTRAVENOUS; SUBCUTANEOUS at 06:53

## 2024-02-14 RX ADMIN — FENTANYL CITRATE 50 MCG: 50 INJECTION INTRAMUSCULAR; INTRAVENOUS at 08:17

## 2024-02-14 RX ADMIN — DEXAMETHASONE SODIUM PHOSPHATE 6 MG: 4 INJECTION, SOLUTION INTRA-ARTICULAR; INTRALESIONAL; INTRAMUSCULAR; INTRAVENOUS; SOFT TISSUE at 07:58

## 2024-02-14 ASSESSMENT — ACTIVITIES OF DAILY LIVING (ADL)
ADLS_ACUITY_SCORE: 21
ADLS_ACUITY_SCORE: 20

## 2024-02-14 NOTE — OP NOTE
[facility] Operative Note    Date of service: 2024    Name: Roxana Lazaro   MRN: [id number]   : 1965     Pre-operative diagnosis:  Postmenopausal bleeding  Cervical stenosis    Post-operative diagnosis:  Same    Surgeon:  Tr Guallpa MD    Assistant:  Rolo Sanchez MD, PGY 4    Procedure:  Endocervical curettage, dilation & curettage under ultrasound guidance    Anesthesia: GETA  Fluids: 500mL  EBL: 10mL    Complications: None  Specimen:   ID Type Source Tests Collected by Time Destination   1 : Endocervical curetting Tissue Endocervix SURGICAL PATHOLOGY EXAM Tr Guallpa MD 2024  7:56 AM    2 : Endometrial curetting Tissue Endometrium SURGICAL PATHOLOGY EXAM Tr Guallpa MD 2024  7:56 AM      Indication: Roxana Lazaro is a 58 year old who presented to clinic for postmenopausal bleeding. Endometrial biopsy was recommended, but unable to be completed in the office due to cervical stenosis. Endometrial sampling in the operating room was recommended and she agreed to proceed. Risk, benefits, and alternatives of procedure were discussed, informed consent was obtained.     Findings:   EUA reveals normal cervix, anteverted uterus, no adnexal masses  Cervix stenotic, but able to be dilated under US guidance  Endocervical and endometrial curettage specimens obtained.     Procedure:  Patient was taken to the OR where she underwent general anesthesia without difficulty. Once the patient was comfortable she was positioned in the dorsal lithotomy position using yellow-fin stirrups.  She was then prepped and draped in the normal sterile fashion. A sterile speculum was placed in the vagina and the cervix was visualized. A tenaculum was placed on the anterior lip of the cervix. An endocervical curettage was obtained. The cervix was serially dilated with Hegar dilators.  A sharp curette was then placed through the cervix and used to curette to obtain an endometrial biopsy  sample. The 4 and 8 o'clock positions of the cervical vaginal junction were then injected with 10mL of  1% lidocaine on each side.  The tenaculum was removed and sites noted to be hemostatic. The speculum was then removed. The patient tolerated the procedure well and was taken to the PACU for recovery in stable condition. Instrument, needle and sponge counts correct x2.   Dr. Guallpa was scrubbed and present for the entire procedure.    Rolo Sanchez MD  OB/GYN PGY-4  02/14/2024 8:08 AM    I was present and scrubbed throughout this case.    Tr Guallpa

## 2024-02-14 NOTE — ANESTHESIA PREPROCEDURE EVALUATION
"Anesthesia Pre-Procedure Evaluation    Patient: Roxana Lazaro   MRN: [id number] : 1965        Procedure : Procedure(s):  DILATION AND CURETTAGE, UTERUS          Past Medical History:   Diagnosis Date    Depression     Endometriosis     Fatty liver     GERD (gastroesophageal reflux disease)     H. pylori infection     resolved    Hyperlipidemia     Palpitations     Polycystic ovaries     PONV (postoperative nausea and vomiting)     Sleep apnea     pt states \"mild\" no c-pap use    Spinal stenosis       Past Surgical History:   Procedure Laterality Date    AS RAD RESEC TONSIL/PILLARS      CHOLECYSTECTOMY      DILATION AND CURETTAGE, OPERATIVE HYSTEROSCOPY WITH MORCELLATOR, COMBINED N/A 2015    Procedure: COMBINED DILATION AND CURETTAGE, OPERATIVE HYSTEROSCOPY WITH MORCELLATOR;  Surgeon: Romelia Coelho MD;  Location: [facility]    ENDOSCOPY      lap choley  1994    left ovarian cystectomy  2003    complicated by wound infection, elevated CA-125, , endometriosis    OPERATIVE HYSTEROSCOPY WITH MORCELLATOR  2013    Procedure: OPERATIVE HYSTEROSCOPY WITH MORCELLATOR;  Hysteroscopy,   Polypectomy with  Myosure, removal of labial skin tag, D  & C;  Surgeon: Romelia Coelho MD;  Location: [facility]      Allergies   Allergen Reactions    Azithromycin Other (See Comments)    Entocort Ec [Budesonide] GI Disturbance     Nausea and vomiting    Sulfa Antibiotics Rash    Vancomycin Rash    Zantac [Ranitidine] Rash      Social History     Tobacco Use    Smoking status: Former     Packs/day: 1.00     Years: 10.00     Additional pack years: 0.00     Total pack years: 10.00     Types: Cigarettes    Smokeless tobacco: Never   Substance Use Topics    Alcohol use: Not Currently     Alcohol/week: 0.8 standard drinks of alcohol     Types: 1 Standard drinks or equivalent per week     Comment: two per month      Wt Readings from Last 1 Encounters:   24 78.2 kg (172 lb 6.4 oz)        Anesthesia Evaluation   "          ROS/MED HX  ENT/Pulmonary:     (+) sleep apnea,                                       Neurologic:       Cardiovascular:       METS/Exercise Tolerance:     Hematologic:       Musculoskeletal:       GI/Hepatic:     (+) GERD,                   Renal/Genitourinary:       Endo:       Psychiatric/Substance Use:       Infectious Disease:       Malignancy:       Other:               OUTSIDE LABS:  CBC:   Lab Results   Component Value Date    WBC 10.0 10/21/2022    WBC 6.9 10/28/2019    HGB 15.3 10/21/2022    HGB 14.0 10/28/2019    HCT 44.8 10/21/2022    HCT 40.4 10/28/2019     10/21/2022     10/28/2019     BMP:   Lab Results   Component Value Date     10/21/2022     10/28/2019    POTASSIUM 4.0 10/21/2022    POTASSIUM 3.8 10/28/2019    CHLORIDE 98 10/21/2022    CHLORIDE 107 10/28/2019    CO2 29 10/21/2022    CO2 26 10/28/2019    BUN 10.0 10/21/2022    BUN 8 10/28/2019    CR 0.80 10/21/2022    CR 0.83 10/28/2019    GLC 93 10/21/2022    GLC 98 10/28/2019     COAGS:   Lab Results   Component Value Date    PTT 28 10/27/2019    INR 0.90 10/27/2019     POC:   Lab Results   Component Value Date     (H) 12/05/2015    HCG Negative 12/20/2013    HCGS Negative 12/04/2015     HEPATIC:   Lab Results   Component Value Date    ALBUMIN 3.9 09/12/2016    PROTTOTAL 7.7 09/12/2016    ALT 27 09/12/2016    AST 14 09/12/2016    ALKPHOS 86 09/12/2016    BILITOTAL 0.6 09/12/2016     OTHER:   Lab Results   Component Value Date    A1C 5.5 10/12/2015    SHARLA 9.3 10/21/2022    MAG 2.2 10/21/2022    TSH 2.24 06/27/2011       Anesthesia Plan    ASA Status:  2    NPO Status:  NPO Appropriate    Anesthesia Type: General.     - Airway: LMA   Induction: Intravenous.   Maintenance: Inhalation.        Consents    Anesthesia Plan(s) and associated risks, benefits, and realistic alternatives discussed. Questions answered and patient/representative(s) expressed understanding.     - Discussed:     - Discussed with:  Patient  "     - Extended Intubation/Ventilatory Support Discussed: Yes.      - Patient is DNR/DNI Status: No     Use of blood products discussed: Yes.     - Discussed with: Patient.     Postoperative Care    Pain management: IV analgesics.   PONV prophylaxis: Ondansetron (or other 5HT-3), Dexamethasone or Solumedrol     Comments:               Christopher J. Behrens, MD    I have reviewed the pertinent notes and labs in the chart from the past 30 days and (re)examined the patient.  Any updates or changes from those notes are reflected in this note.              # Overweight: Estimated body mass index is 28.69 kg/m  as calculated from the following:    Height as of this encounter: 1.651 m (5' 5\").    Weight as of this encounter: 78.2 kg (172 lb 6.4 oz).      "

## 2024-02-14 NOTE — DISCHARGE INSTRUCTIONS
Contacting your Doctor -   To contact a doctor, call Dr Guallpa at [phone] at the Women's Health/Gynecologic Clinic or [phone] and ask for the resident on call for Gynecology (answered 24 hours a day)   Emergency Department:  [facility]: [phone]  [facility]: [phone] 911 if you are in need of immediate or emergent help

## 2024-02-14 NOTE — PROGRESS NOTES
Gynecologic Oncology Postoperative Check Note  2/14/2024    S: Feeling well. No pain. Was up to bathroom to void. Noted some blood on the paper and in the toilet but not soaking pads. Denies HA/dizziness, CP, SOB, or N/V. She has tolerated some PO liquids.    O:  Vitals:    02/14/24 0945 02/14/24 1024   BP: 126/71 130/72   Pulse: 85 90   Resp: 16 16   Temp: 98.1  F (36.7  C) 97.8  F (36.6  C)   TempSrc: Oral Oral   SpO2: 93% 95%   O2 RA    Gen: Alert. Sitting up in chair. Answers questions appropriately. NAD.  Cardio: HR regular  Resp: LS clear  Abdomen: BS+, Soft, NT    A/P: 58 year old POD#0 s/p D&C. Patient doing well post op. Reviewed surgical procedure and awaiting pathology results. Reviewed discharge instructions and when to call the clinic. She will follow up in clinic as scheduled or sooner if needed.      Dz: PMB, adnexal masses. Plan per Dr Guallpa: endometrial biopsy and ECC to exclude abnormal pathology potentially followed by TLH/BSO or observation depending on the findings and/or persistence of symptoms. She will follow up in clinic with Dr Guallpa 3/4/24.  FEN: Regular diet.  Pain: Currently denies pain. Acetaminophen and ibuprofen prn  Heme: Hgb 14.7> EBL 10  CV: HLD, continue home meds  Pulm: GLENDA  GI: prn antiemetics  Psych/Neuro/MSK: MDD, spinal stenosis, resume home meds PRN.  Dispo: home post op with partner  Drains/Lines: PIV, to be removed prior to discharge    Kenya Ness, APRN, DNP, CNP  2/14/2024 10:47 AM

## 2024-02-14 NOTE — ANESTHESIA CARE TRANSFER NOTE
Patient: Roxana Lazaro    Procedure: Procedure(s):  DILATION AND CURETTAGE, UTERUS       Diagnosis: Cyst of uterus [N85.8]  Diagnosis Additional Information: No value filed.    Anesthesia Type:   General     Note:    Oropharynx: oropharynx clear of all foreign objects  Level of Consciousness: awake  Oxygen Supplementation: nasal cannula  Level of Supplemental Oxygen (L/min / FiO2): 3 LPM  Independent Airway: airway patency satisfactory and stable  Dentition: dentition unchanged  Vital Signs Stable: post-procedure vital signs reviewed and stable  Report to RN Given: handoff report given  Patient transferred to: PACU    Handoff Report: Identifed the Patient, Identified the Reponsible Provider, Reviewed the pertinent medical history, Discussed the surgical course, Reviewed Intra-OP anesthesia mangement and issues during anesthesia, Set expectations for post-procedure period and Allowed opportunity for questions and acknowledgement of understanding      Vitals:  Vitals Value Taken Time   /78 02/14/24 0815   Temp     Pulse 100 02/14/24 0817   Resp 11 02/14/24 0817   SpO2 97 % 02/14/24 0817   Vitals shown include unfiled device data.    Electronically Signed By: CASTILLO Borges CRNA  February 14, 2024  8:18 AM

## 2024-02-14 NOTE — ANESTHESIA POSTPROCEDURE EVALUATION
Patient: Roxana Lazaro    Procedure: Procedure(s):  DILATION AND CURETTAGE, UTERUS       Anesthesia Type:  General    Note:  Disposition: Outpatient   Postop Pain Control: Uneventful            Sign Out: Well controlled pain   PONV: No   Neuro/Psych: Uneventful            Sign Out: Acceptable/Baseline neuro status   Airway/Respiratory: Uneventful            Sign Out: Acceptable/Baseline resp. status   CV/Hemodynamics: Uneventful            Sign Out: Acceptable CV status   Other NRE: NONE   DID A NON-ROUTINE EVENT OCCUR? No           Last vitals:  Vitals Value Taken Time   /84 02/14/24 0845   Temp 36.6  C (97.9  F) 02/14/24 0845   Pulse 85 02/14/24 0855   Resp 24 02/14/24 0855   SpO2 96 % 02/14/24 0855   Vitals shown include unfiled device data.    Electronically Signed By: Christopher J. Behrens, MD  February 14, 2024  8:56 AM

## 2024-02-14 NOTE — ANESTHESIA PROCEDURE NOTES
Airway       Patient location during procedure: OR       Procedure Start/Stop Times: 2/14/2024 7:47 AM and 2/14/2024 7:49 AM  Staff -        CRNA: Agustín Sheets APRN CRNA       Performed By: CRNA  Consent for Airway        Urgency: elective  Indications and Patient Condition       Indications for airway management: kian-procedural       Induction type:RSI       Mask difficulty assessment: 0 - not attempted    Final Airway Details       Final airway type: endotracheal airway       Successful airway: ETT - single  Endotracheal Airway Details        ETT size (mm): 7.0       Cuffed: yes       Successful intubation technique: direct laryngoscopy       DL Blade Type: Swift 2       Grade View of Cords: 1       Adjucts: stylet       Position: Right       Measured from: gums/teeth       Secured at (cm): 21       Bite block used: None    Post intubation assessment        Placement verified by: capnometry, equal breath sounds and chest rise        Number of attempts at approach: 1       Number of other approaches attempted: 0       Secured with: pink tape       Ease of procedure: easy       Dentition: Intact    Medication(s) Administered   Medication Administration Time: 2/14/2024 7:47 AM

## 2024-02-16 LAB
PATH REPORT.COMMENTS IMP SPEC: NORMAL
PATH REPORT.COMMENTS IMP SPEC: NORMAL
PATH REPORT.FINAL DX SPEC: NORMAL
PATH REPORT.GROSS SPEC: NORMAL
PATH REPORT.MICROSCOPIC SPEC OTHER STN: NORMAL
PATH REPORT.RELEVANT HX SPEC: NORMAL
PHOTO IMAGE: NORMAL

## 2024-02-19 ENCOUNTER — PATIENT OUTREACH (OUTPATIENT)
Dept: ONCOLOGY | Facility: CLINIC | Age: 59
End: 2024-02-19

## 2024-03-04 ENCOUNTER — ONCOLOGY VISIT (OUTPATIENT)
Dept: ONCOLOGY | Facility: CLINIC | Age: 59
End: 2024-03-04
Attending: OBSTETRICS & GYNECOLOGY
Payer: COMMERCIAL

## 2024-03-04 VITALS
RESPIRATION RATE: 16 BRPM | SYSTOLIC BLOOD PRESSURE: 124 MMHG | WEIGHT: 176.4 LBS | BODY MASS INDEX: 29.35 KG/M2 | TEMPERATURE: 98.1 F | OXYGEN SATURATION: 96 % | DIASTOLIC BLOOD PRESSURE: 78 MMHG | HEART RATE: 103 BPM

## 2024-03-04 DIAGNOSIS — N83.202 CYSTS OF BOTH OVARIES: Primary | ICD-10-CM

## 2024-03-04 DIAGNOSIS — N83.201 CYSTS OF BOTH OVARIES: Primary | ICD-10-CM

## 2024-03-04 DIAGNOSIS — N95.0 PMB (POSTMENOPAUSAL BLEEDING): ICD-10-CM

## 2024-03-04 PROCEDURE — 99024 POSTOP FOLLOW-UP VISIT: CPT | Performed by: OBSTETRICS & GYNECOLOGY

## 2024-03-04 PROCEDURE — G0463 HOSPITAL OUTPT CLINIC VISIT: HCPCS | Performed by: OBSTETRICS & GYNECOLOGY

## 2024-03-04 ASSESSMENT — PAIN SCALES - GENERAL: PAINLEVEL: NO PAIN (0)

## 2024-03-04 NOTE — LETTER
3/4/2024         RE: Roxana Lazaro  [address]  [place] [address]        Dear Colleague,    Thank you for referring your patient, Roxana Lazaro, to the [facility]. Please see a copy of my visit note below.                            Consult Notes on Referred Patient        RE: Roxana Lazaro  : 1965  JOHANNA: 3/4/2024     Dear  Referred Self:    I had the pleasure of seeing your patient Roxana Lazaro here at the Gynecologic Cancer Clinic at the [facility] on 2024.  As you know she is a very pleasant 58 year old woman with a recent diagnosis of  PMB.  Given these findings she was subsequently sent to the Gynecologic Cancer Clinic for new patient consultation.     11/10/23 Pelvic US:  Impression  1. The uterus  is normal in appearance.  2. The right ovary was not identified in today's study.  3. The left ovary was noted to have a fluid filled cyst with septations,  5.3 x 4.4 x 4.4 cm size.  It was 3.2 cm in .  Recommend gynecology  consultation for management.  -- BOOKMARKED    She underwent D&C on 24  PATH:    Final Diagnosis   A. Endocervix, Curettage:  - Fragments of mucus, benign endometrium, and benign squamous epithelium     B. Endometrium, Curettage:  - Fragments of inactive endometrium with cystic glands  - Mucus and benign squamous epithelium  - Negative for hyperplasia, atypia, or malignancy       Review of Systems:    Systemic           no weight changes; no fever; no chills; no night sweats; no appetite changes  Skin           no rashes, or lesions  Eye           no irritation; no changes in vision  Radha-Laryngeal           no dysphagia; no hoarseness   Pulmonary    no cough; no shortness of breath  Cardiovascular    no chest pain; no palpitations  Gastrointestinal    no diarrhea; no constipation; no abdominal pain; no changes in bowel  habits; no blood in stool  Genitourinary   no urinary frequency; no urinary  "urgency; no dysuria; no pain; no abnormal vaginal discharge; no abnormal vaginal bleeding  Breast   no breast discharge; no breast changes; no breast pain  Musculoskeletal    no myalgias; no arthralgias; no back pain  Psychiatric           no depressed mood; no anxiety    Hematologic           no tender lymph nodes; no noticeable swellings or lumps   Endocrine    no hot flashes; no heat/cold intolerance         Neurological   no tremor; no numbness and tingling; no headaches; no difficulty  sleeping      Past Medical History:    Past Medical History:   Diagnosis Date     Depression      Endometriosis      Fatty liver      GERD (gastroesophageal reflux disease)      H. pylori infection     resolved     Hyperlipidemia      Palpitations      Polycystic ovaries      PONV (postoperative nausea and vomiting)      Sleep apnea     pt states \"mild\" no c-pap use     Spinal stenosis          Past Surgical History:    Past Surgical History:   Procedure Laterality Date     AS RAD RESEC TONSIL/PILLARS       CHOLECYSTECTOMY       DILATION AND CURETTAGE N/A 2/14/2024    Procedure: DILATION AND CURETTAGE, UTERUS;  Surgeon: Tr Guallpa MD;  [facility]     DILATION AND CURETTAGE, OPERATIVE HYSTEROSCOPY WITH MORCELLATOR, COMBINED N/A 12/04/2015    Procedure: COMBINED DILATION AND CURETTAGE, OPERATIVE HYSTEROSCOPY WITH MORCELLATOR;  Surgeon: Romelia Coelho MD;  Location: [facility]     ENDOSCOPY       lap choley  01/01/1994     left ovarian cystectomy  07/01/2003    complicated by wound infection, elevated CA-125, , endometriosis     OPERATIVE HYSTEROSCOPY WITH MORCELLATOR  12/20/2013    Procedure: OPERATIVE HYSTEROSCOPY WITH MORCELLATOR;  Hysteroscopy,   Polypectomy with  Myosure, removal of labial skin tag, D  & C;  Surgeon: Romelia Coelho MD;  Location: [facility] OR         Health Maintenance:  Health Maintenance Due   Topic Date Due     ADVANCE CARE PLANNING  Never done     Pneumococcal Vaccine: Pediatrics (0 to 5 " Years) and At-Risk Patients (6 to 64 Years) (1 of 2 - PCV) Never done     HIV SCREENING  Never done     HEPATITIS C SCREENING  Never done     ZOSTER IMMUNIZATION (1 of 2) Never done     HEPATITIS B IMMUNIZATION (1 of 3 - 19+ 3-dose series) Never done     HPV TEST  10/12/2020     LIPID  10/12/2020     PAP  10/12/2020     YEARLY PREVENTIVE VISIT  2022     DTAP/TDAP/TD IMMUNIZATION (2 - Td or Tdap) 10/30/2022     LUNG CANCER SCREENING  2023     COVID-19 Vaccine ( - - season) 2023     PHQ-2 (once per calendar year)  Never done           Current Medications:     has a current medication list which includes the following prescription(s): acetaminophen, cholecalciferol, gabapentin, ibuprofen, mag glycinate, ondansetron, orphenadrine er, pravastatin, rosuvastatin, and sucralfate.       Allergies:     Azithromycin, Entocort ec [budesonide], Sulfa antibiotics, Vancomycin, and Zantac [ranitidine]        Social History:     Social History     Tobacco Use     Smoking status: Former     Packs/day: 1.00     Years: 10.00     Additional pack years: 0.00     Total pack years: 10.00     Types: Cigarettes     Smokeless tobacco: Never   Substance Use Topics     Alcohol use: Not Currently     Alcohol/week: 0.8 standard drinks of alcohol     Types: 1 Standard drinks or equivalent per week     Comment: two per month       History   Drug Use Unknown           Family History:     The patient's family history is notable for .    Family History   Problem Relation Age of Onset     Thyroid Disease Mother 60        thyroid cancer     Gynecology Mother 40        pelvic pain     Gynecology Sister 30        pelvic pain     Alcohol/Drug Sister 37         alcholism     Cancer Maternal Grandmother         pancreatic CA     Breast Cancer Maternal Aunt         and maternal cousin; maternal aunt with second breast cancer     Breast Cancer Maternal Cousin      Cancer Other         mat GM's mom ovarian CA     Breast Cancer Other          told by a paternal aunt a lot of breast CA in their family, do not know details         Physical Exam:     /78 (BP Location: Right arm, Patient Position: Sitting, Cuff Size: Adult Regular)   Pulse 103   Temp 98.1  F (36.7  C) (Oral)   Resp 16   Wt 80 kg (176 lb 6.4 oz)   LMP 09/28/2015 (Approximate)   SpO2 96%   BMI 29.35 kg/m    Body mass index is 29.35 kg/m .    General Appearance: healthy and alert, no distress     HEENT:  no thyromegaly, no palpable nodules or masses        Cardiovascular: regular rate and rhythm, no gallops, rubs or murmurs     Respiratory: lungs clear, no rales, rhonchi or wheezes, normal diaphragmatic excursion    Musculoskeletal: extremities non tender and without edema    Skin: no lesions or rashes     Neurological: normal gait, no gross defects     Psychiatric: appropriate mood and affect                               Hematological: normal cervical, supraclavicular and inguinal lymph nodes     Gastrointestinal:       abdomen soft, non-tender, non-distended, no organomegaly or masses    Genitourinary: External genitalia and urethral meatus appears normal.  Vagina is smooth without nodularity or masses.  Cervix appears normal and without lesions.  Bimanual exam reveal no masses, nodularity or fullness.  Recto-vaginal exam confirms these findings.      Assessment:    Roxana Lazaro is a 58 year old woman with a new diagnosis of PMB with adnexal mass.     A total of 30 minutes was spent with the patient, 25 minutes of which were spent in counseling the patient and/or treatment planning.      Plan:     1.)   Mass/PMB: I am encouraged by her report and clinical findings.  She understands that malignancy is not excluded but perhaps less liekly.  WE have discussed options from TLH/BSO, though observation again at length.  She will consider these and make a decision regarding her desires.     2.) Genetic risk factors were assessed and the patient does not meet the  qualifications for a referral.      3.) Labs and/or tests ordered include:  none.     4.) Health maintenance issues addressed today include none.        Thank you for allowing us to participate in the care of your patient.         Sincerely,        Patient Care Team:  No Ref-Primary, Physician as PCP - General  Romelia Coelho MD as MD (OB/Gyn)  Romelia Coelho MD as Referring Physician (OB/Gyn)  Lyly Chaudhry NP as PCP (Family Practice)  Joanne Jones MD as MD (Urology)  Shahnaz Portillo MD as MD (Family Practice)  Elliot Solomon MD as MD (Internal Medicine)  Vanessa Wong MD as MD (Cardiology)  Tr Guallpa MD as MD (Gynecologic Oncology)  Tr Guallpa MD as Assigned Cancer Care Provider  SELF, REFERRED        Again, thank you for allowing me to participate in the care of your patient.        Sincerely,        Tr Guallpa MD

## 2024-03-04 NOTE — NURSING NOTE
"Oncology Rooming Note    March 4, 2024 3:11 PM   Roxana Lazaro is a 58 year old female who presents for:    Chief Complaint   Patient presents with    Oncology Clinic Visit     Cysts of both ovaries; Cyst of uterus     Initial Vitals: /78 (BP Location: Right arm, Patient Position: Sitting, Cuff Size: Adult Regular)   Pulse 103   Temp 98.1  F (36.7  C) (Oral)   Resp 16   Wt 80 kg (176 lb 6.4 oz)   LMP 09/28/2015 (Approximate)   SpO2 96%   BMI 29.35 kg/m   Estimated body mass index is 29.35 kg/m  as calculated from the following:    Height as of 2/14/24: 1.651 m (5' 5\").    Weight as of this encounter: 80 kg (176 lb 6.4 oz). Body surface area is 1.92 meters squared.  No Pain (0) Comment: Data Unavailable   Patient's last menstrual period was 09/28/2015 (approximate).  Allergies reviewed: Yes  Medications reviewed: Yes    Medications: Medication refills not needed today.  Pharmacy name entered into [facility]: [facility] [place], [place]    Frailty Screening:   Is the patient here for a new oncology consult visit in cancer care? 2. No      Clinical concerns: none       Mercedes Romo              "

## 2024-03-04 NOTE — PATIENT INSTRUCTIONS
It was a pleasure seeing you in clinic today-please reach out if there are any further questions that arise following today's visit.  During business hours, you may reach me at [phone].  For urgent/emergent questions after business hours, you may reach the on-call Gynecologic Oncology Resident through the [facility]  at [phone].    All normal test results are usually communicated via letter or "[employer]hart message.  Abnormal results (those that require a change in the previously discussed plan of care) are usually communicated via a phone call.    I recommend signing up for [employer] access if you have not already done so.  This allows you online access to your lab results and also helps you communicate efficiently with your clinic should any questions arise in your care.    Call if you desire surgery, if declining surgery will need follow-up pelvic ultrasounds    Dr Saloni Fletcher RN  Phone:  [phone]  Fax:  [phone]

## 2024-03-04 NOTE — PROGRESS NOTES
Consult Notes on Referred Patient        RE: Roxana Lazaro  : 1965  JOHANNA: 3/4/2024     Dear Dr. Huff Self:    I had the pleasure of seeing your patient Roxana Lazaro here at the Gynecologic Cancer Clinic at the [facility] on 2024.  As you know she is a very pleasant 58 year old woman with a recent diagnosis of  PMB.  Given these findings she was subsequently sent to the Gynecologic Cancer Clinic for new patient consultation.     11/10/23 Pelvic US:  Impression  1. The uterus  is normal in appearance.  2. The right ovary was not identified in today's study.  3. The left ovary was noted to have a fluid filled cyst with septations,  5.3 x 4.4 x 4.4 cm size.  It was 3.2 cm in .  Recommend gynecology  consultation for management.  -- BOOKMARKED    She underwent D&C on 24  PATH:    Final Diagnosis   A. Endocervix, Curettage:  - Fragments of mucus, benign endometrium, and benign squamous epithelium     B. Endometrium, Curettage:  - Fragments of inactive endometrium with cystic glands  - Mucus and benign squamous epithelium  - Negative for hyperplasia, atypia, or malignancy       Review of Systems:    Systemic           no weight changes; no fever; no chills; no night sweats; no appetite changes  Skin           no rashes, or lesions  Eye           no irritation; no changes in vision  Radha-Laryngeal           no dysphagia; no hoarseness   Pulmonary    no cough; no shortness of breath  Cardiovascular    no chest pain; no palpitations  Gastrointestinal    no diarrhea; no constipation; no abdominal pain; no changes in bowel  habits; no blood in stool  Genitourinary   no urinary frequency; no urinary urgency; no dysuria; no pain; no abnormal vaginal discharge; no abnormal vaginal bleeding  Breast   no breast discharge; no breast changes; no breast pain  Musculoskeletal    no myalgias; no arthralgias; no back pain  Psychiatric           no depressed mood; no  "anxiety    Hematologic           no tender lymph nodes; no noticeable swellings or lumps   Endocrine    no hot flashes; no heat/cold intolerance         Neurological   no tremor; no numbness and tingling; no headaches; no difficulty  sleeping      Past Medical History:    Past Medical History:   Diagnosis Date    Depression     Endometriosis     Fatty liver     GERD (gastroesophageal reflux disease)     H. pylori infection     resolved    Hyperlipidemia     Palpitations     Polycystic ovaries     PONV (postoperative nausea and vomiting)     Sleep apnea     pt states \"mild\" no c-pap use    Spinal stenosis          Past Surgical History:    Past Surgical History:   Procedure Laterality Date    AS RAD RESEC TONSIL/PILLARS      CHOLECYSTECTOMY      DILATION AND CURETTAGE N/A 2/14/2024    Procedure: DILATION AND CURETTAGE, UTERUS;  Surgeon: Tr Guallpa MD;  Location: [facility]    DILATION AND CURETTAGE, OPERATIVE HYSTEROSCOPY WITH MORCELLATOR, COMBINED N/A 12/04/2015    Procedure: COMBINED DILATION AND CURETTAGE, OPERATIVE HYSTEROSCOPY WITH MORCELLATOR;  Surgeon: Romelia Coelho MD;  Location: [facility] OR    ENDOSCOPY      lap choley  01/01/1994    left ovarian cystectomy  07/01/2003    complicated by wound infection, elevated CA-125, , endometriosis    OPERATIVE HYSTEROSCOPY WITH MORCELLATOR  12/20/2013    Procedure: OPERATIVE HYSTEROSCOPY WITH MORCELLATOR;  Hysteroscopy,   Polypectomy with  Myosure, removal of labial skin tag, D  & C;  Surgeon: Romelia Coelho MD;  Location: [facility] OR         Health Maintenance:  Health Maintenance Due   Topic Date Due    ADVANCE CARE PLANNING  Never done    Pneumococcal Vaccine: Pediatrics (0 to 5 Years) and At-Risk Patients (6 to 64 Years) (1 of 2 - PCV) Never done    HIV SCREENING  Never done    HEPATITIS C SCREENING  Never done    ZOSTER IMMUNIZATION (1 of 2) Never done    HEPATITIS B IMMUNIZATION (1 of 3 - 19+ 3-dose series) Never done    HPV TEST  10/12/2020    LIPID  " 10/12/2020    PAP  10/12/2020    YEARLY PREVENTIVE VISIT  2022    DTAP/TDAP/TD IMMUNIZATION (2 - Td or Tdap) 10/30/2022    LUNG CANCER SCREENING  2023    COVID-19 Vaccine ( season) 2023    PHQ-2 (once per calendar year)  Never done           Current Medications:     has a current medication list which includes the following prescription(s): acetaminophen, cholecalciferol, gabapentin, ibuprofen, mag glycinate, ondansetron, orphenadrine er, pravastatin, rosuvastatin, and sucralfate.       Allergies:     Azithromycin, Entocort ec [budesonide], Sulfa antibiotics, Vancomycin, and Zantac [ranitidine]        Social History:     Social History     Tobacco Use    Smoking status: Former     Packs/day: 1.00     Years: 10.00     Additional pack years: 0.00     Total pack years: 10.00     Types: Cigarettes    Smokeless tobacco: Never   Substance Use Topics    Alcohol use: Not Currently     Alcohol/week: 0.8 standard drinks of alcohol     Types: 1 Standard drinks or equivalent per week     Comment: two per month       History   Drug Use Unknown           Family History:     The patient's family history is notable for .    Family History   Problem Relation Age of Onset    Thyroid Disease Mother 60        thyroid cancer    Gynecology Mother 40        pelvic pain    Gynecology Sister 30        pelvic pain    Alcohol/Drug Sister 37         alcholism    Cancer Maternal Grandmother         pancreatic CA    Breast Cancer Maternal Aunt         and maternal cousin; maternal aunt with second breast cancer    Breast Cancer Maternal Cousin     Cancer Other         mat GM's mom ovarian CA    Breast Cancer Other         told by a paternal aunt a lot of breast CA in their family, do not know details         Physical Exam:     /78 (BP Location: Right arm, Patient Position: Sitting, Cuff Size: Adult Regular)   Pulse 103   Temp 98.1  F (36.7  C) (Oral)   Resp 16   Wt 80 kg (176 lb 6.4 oz)   LMP  09/28/2015 (Approximate)   SpO2 96%   BMI 29.35 kg/m    Body mass index is 29.35 kg/m .    General Appearance: healthy and alert, no distress     HEENT:  no thyromegaly, no palpable nodules or masses        Cardiovascular: regular rate and rhythm, no gallops, rubs or murmurs     Respiratory: lungs clear, no rales, rhonchi or wheezes, normal diaphragmatic excursion    Musculoskeletal: extremities non tender and without edema    Skin: no lesions or rashes     Neurological: normal gait, no gross defects     Psychiatric: appropriate mood and affect                               Hematological: normal cervical, supraclavicular and inguinal lymph nodes     Gastrointestinal:       abdomen soft, non-tender, non-distended, no organomegaly or masses    Genitourinary: External genitalia and urethral meatus appears normal.  Vagina is smooth without nodularity or masses.  Cervix appears normal and without lesions.  Bimanual exam reveal no masses, nodularity or fullness.  Recto-vaginal exam confirms these findings.      Assessment:    Roxana Lazaro is a 58 year old woman with a new diagnosis of PMB with adnexal mass.     A total of 30 minutes was spent with the patient, 25 minutes of which were spent in counseling the patient and/or treatment planning.      Plan:     1.)   Mass/PMB: I am encouraged by her report and clinical findings.  She understands that malignancy is not excluded but perhaps less liekly.  WE have discussed options from TLH/BSO, though observation again at length.  She will consider these and make a decision regarding her desires.     2.) Genetic risk factors were assessed and the patient does not meet the qualifications for a referral.      3.) Labs and/or tests ordered include:  none.     4.) Health maintenance issues addressed today include none.        Thank you for allowing us to participate in the care of your patient.         Sincerely,        Patient Care Team:  No Ref-Primary, Physician as PCP  - General  Romelia Coelho MD as MD (OB/Gyn)  Romelia Coelho MD as Referring Physician (OB/Gyn)  Lyly Chaudhry NP as PCP (Family Practice)  Joanne Jones MD as MD (Urology)  Shahnaz Portillo MD as MD (Family Practice)  Elliot Solomon MD as MD (Internal Medicine)  Vanessa Wong MD as MD (Cardiology)  Tr Guallpa MD as MD (Gynecologic Oncology)  Tr Guallpa MD as Assigned Cancer Care Provider  SELF, REFERRED

## 2024-06-24 ENCOUNTER — TELEPHONE (OUTPATIENT)
Dept: ONCOLOGY | Facility: CLINIC | Age: 59
End: 2024-06-24
Payer: COMMERCIAL

## 2024-06-24 NOTE — TELEPHONE ENCOUNTER
Called and spoke with patient regarding scheduling surgery with Dr. Guallpa.     Writer informed patient that no surgery orders are currently active and that writer will reach out to Dr. Guallpa. Writer stated once surgery orders are submitted, writer will call patient to schedule. Patient confirmed understanding.    Patient stated they prefer surgery in September or November as that is when they will be able to take time off work.    Patient currently has an imaging appointment on 9/23/24 and a clinic appointment with Dr. Guallpa on 9/30/24.    Diogo Uriostegui on 6/24/2024 at 1:16 PM

## 2024-06-24 NOTE — TELEPHONE ENCOUNTER
Received voice mail from patient regarding scheduling surgery with Dr. Guallpa. Requested call back at [phone]    Diogo Uriostegui on 6/24/2024 at 1:07 PM

## 2024-08-07 NOTE — TELEPHONE ENCOUNTER
Received call and spoke with patient regarding scheduling surgery with Dr. Guallpa.    Writer informed patient that Dr. Guallpa requested to see patient in clinic prior to submitting surgery orders/scheduling. Patient stated they did not receive a call to schedule clinic appointment.    Patient has CT scheduled 9/23/24 and clinic appointment with Dr. Guallpa 9/30/24 (scheduled prior to patient's original call on 6/24/24).    Patient stated they will be unavailable through the end of August and that they will keep appointment on 9/30/24 with Dr. Guallpa and proceed from there. Writer confirmed understanding.    Diogo Uriostegui on 8/7/2024 at 12:53 PM

## 2024-08-10 ENCOUNTER — HOSPITAL ENCOUNTER (EMERGENCY)
Facility: HOSPITAL | Age: 59
Discharge: HOME | End: 2024-08-10
Attending: FAMILY MEDICINE | Admitting: FAMILY MEDICINE
Payer: COMMERCIAL

## 2024-08-10 VITALS
BODY MASS INDEX: 28.66 KG/M2 | RESPIRATION RATE: 20 BRPM | HEART RATE: 84 BPM | DIASTOLIC BLOOD PRESSURE: 60 MMHG | WEIGHT: 172 LBS | OXYGEN SATURATION: 97 % | HEIGHT: 65 IN | TEMPERATURE: 97.5 F | SYSTOLIC BLOOD PRESSURE: 131 MMHG

## 2024-08-10 DIAGNOSIS — M79.672 PAIN OF LEFT HEEL: ICD-10-CM

## 2024-08-10 DIAGNOSIS — M54.12 CERVICAL NEUROPATHIC PAIN: ICD-10-CM

## 2024-08-10 PROCEDURE — 99283 EMERGENCY DEPT VISIT LOW MDM: CPT

## 2024-08-10 RX ORDER — GABAPENTIN 300 MG/1
300 CAPSULE ORAL 3 TIMES DAILY
Qty: 90 CAPSULE | Refills: 0 | Status: SHIPPED | OUTPATIENT
Start: 2024-08-10

## 2024-08-10 RX ORDER — PREDNISONE 10 MG/1
TABLET ORAL
Qty: 22 TABLET | Refills: 0 | Status: SHIPPED | OUTPATIENT
Start: 2024-08-10 | End: 2024-08-21

## 2024-08-10 RX ORDER — PREDNISONE 10 MG/1
TABLET ORAL
Qty: 22 TABLET | Refills: 0 | Status: SHIPPED | OUTPATIENT
Start: 2024-08-10 | End: 2024-08-10

## 2024-08-10 RX ORDER — PANTOPRAZOLE SODIUM 40 MG/1
40 TABLET, DELAYED RELEASE ORAL DAILY
Qty: 30 TABLET | Refills: 0 | Status: SHIPPED | OUTPATIENT
Start: 2024-08-10 | End: 2024-09-09

## 2024-08-10 ASSESSMENT — ACTIVITIES OF DAILY LIVING (ADL): ADLS_ACUITY_SCORE: 34

## 2024-08-10 ASSESSMENT — COLUMBIA-SUICIDE SEVERITY RATING SCALE - C-SSRS
2. HAVE YOU ACTUALLY HAD ANY THOUGHTS OF KILLING YOURSELF IN THE PAST MONTH?: NO
6. HAVE YOU EVER DONE ANYTHING, STARTED TO DO ANYTHING, OR PREPARED TO DO ANYTHING TO END YOUR LIFE?: NO
1. IN THE PAST MONTH, HAVE YOU WISHED YOU WERE DEAD OR WISHED YOU COULD GO TO SLEEP AND NOT WAKE UP?: NO

## 2024-08-10 NOTE — DISCHARGE INSTRUCTIONS
Take prednisone with food intake Protonix while you are taking this    Start taking gabapentin 300 mg 3 times a day, increase to 600 mg in the evening and 300 mg twice a day if you are still having difficulty sleeping or need better pain control.  If the medicine is making you too sleepy, you may decrease to twice a day

## 2024-08-10 NOTE — ED PROVIDER NOTES
EMERGENCY DEPARTMENT ENCOUNTER      NAME: Roxana Lazaro  AGE: 59 year old female  YOB: 1965  MRN: [id number]  EVALUATION DATE & TIME: No admission date for patient encounter.    PCP: No Ref-Primary, Physician    ED PROVIDER: Phi Vargas M.D.    Chief Complaint   Patient presents with    chronic  bilateral arm numbness       FINAL IMPRESSION:  No diagnosis found.    ED COURSE & MEDICAL DECISION MAKING:    Pertinent Labs & Imaging studies independently interpreted by me. (See chart for details)  Reviewed most recent primary care telemedicine visit at which time concern for foot pain was addressed and x-ray ordered, also was having numbness in the left fingertips down the left arm thought to possibly be related to carpal tunnel syndrome, wrist base ordered and EMG recommended.    ED Course as of 08/10/24 0528   Sat Aug 10, 2024   0526 Patient seen and examined, she has over a month of left arm pain and stiffness which she describes as an electrical shooting into the fingers and some of the forearm as well.  Does have some left-sided neck pain.  On exam here, she does have some cervical paraspinous tenderness on the left, strength and sensation of the left upper extremity intact.  She has a history of spinal stenosis, symptoms today could be from that but also could be from other nerve impingement.  No midline cervical tenderness, no fever, no pain with neck motion, neurologically intact, no indication for emergent cervical MRI at this time.  Patient was started on prednisone and spine care referral made.  Patient also reports some lightheadedness on waking for the last couple of days, suspect this may be related to poor sleep, no chest pain or shortness of breath, no vertigo.         At the conclusion of the encounter I discussed the results of all of the tests and the disposition. The questions were answered. The patient or family acknowledged understanding and was agreeable with the care  plan.     Medical Decision Making  Obtained supplemental history:Supplemental history obtained?: No  Reviewed external records: External records reviewed?: Documented in chart  Care impacted by chronic illness:Hyperlipidemia  Care significantly affected by social determinants of health:Access to Affordable Health Care  Did you consider but not order tests?: Work up considered but not performed and documented in chart, if applicable  Did you interpret images independently?: Independent interpretation of ECG and images noted in documentation, when applicable.  Consultation discussion with other provider:Did you involve another provider (consultant, , pharmacy, etc.)?: No  Discharge. I prescribed additional prescription strength medication(s) as charted. N/A.    EKG  MEDICATIONS GIVEN IN THE EMERGENCY:  Medications - No data to display    NEW PRESCRIPTIONS STARTED AT TODAY'S ER VISIT  New Prescriptions    No medications on file       =================================================================    HPI    Patient information was obtained from: patient      Roxana Lazaro is a 59 year old female with a pertinent history of spinal stenosis who presents to this ED for evaluation of left hand and arm pain.  She reports that this has been ongoing for over a month, she describes electrical shock sensation into her fingers that involves all the fingers, also into the forearm.  Says the arm feels stiff.  Also notes some pain in the left heel which is atraumatic and also has been going on for about a month, waxes and wanes.  No prior spinal surgeries.  Has not noted any weakness in the hand, just that it feels stiff.  Also some pain on the left posterior neck.  No history of trauma.    She also notes some lightheadedness on waking for the last couple of days, has not been sleeping well.  Eating and drinking okay, no chest pain or shortness of breath, no nausea or vomiting.  Denies vertigo or room spinning  "dizziness.      REVIEW OF SYSTEMS   Review of Systems   All other systems reviewed and negative    PAST MEDICAL HISTORY:  Past Medical History:   Diagnosis Date    Depression     Endometriosis     Fatty liver     GERD (gastroesophageal reflux disease)     H. pylori infection     resolved    Hyperlipidemia     Palpitations     Polycystic ovaries     PONV (postoperative nausea and vomiting)     Sleep apnea     pt states \"mild\" no c-pap use    Spinal stenosis        PAST SURGICAL HISTORY:  Past Surgical History:   Procedure Laterality Date    AS RAD RESEC TONSIL/PILLARS      CHOLECYSTECTOMY      DILATION AND CURETTAGE N/A 2/14/2024    Procedure: DILATION AND CURETTAGE, UTERUS;  Surgeon: Tr Guallpa MD;  Location: [facility] OR    DILATION AND CURETTAGE, OPERATIVE HYSTEROSCOPY WITH MORCELLATOR, COMBINED N/A 12/04/2015    Procedure: COMBINED DILATION AND CURETTAGE, OPERATIVE HYSTEROSCOPY WITH MORCELLATOR;  Surgeon: Romelia Coelho MD;  Location: [facility] OR    ENDOSCOPY      lap choley  01/01/1994    left ovarian cystectomy  07/01/2003    complicated by wound infection, elevated CA-125, , endometriosis    OPERATIVE HYSTEROSCOPY WITH MORCELLATOR  12/20/2013    Procedure: OPERATIVE HYSTEROSCOPY WITH MORCELLATOR;  Hysteroscopy,   Polypectomy with  Myosure, removal of labial skin tag, D  & C;  Surgeon: Romelia Coelho MD;  Location: [facility] OR       CURRENT MEDICATIONS:    No current facility-administered medications for this encounter.     Current Outpatient Medications   Medication Sig Dispense Refill    acetaminophen (TYLENOL) 325 MG tablet Take 2 tablets (650 mg) by mouth every 6 hours as needed for mild pain 24 tablet 0    cholecalciferol 50 MCG (2000 UT) tablet Take 2,000 Units by mouth daily      gabapentin (NEURONTIN) 300 MG capsule Take 1 capsule (300 mg) by mouth 2 times daily as needed for neuropathic pain 60 capsule 0    ibuprofen (ADVIL/MOTRIN) 600 MG tablet Take 1 tablet (600 mg) by mouth every 6 hours as needed " for other (mild and/or inflammatory pain.) 12 tablet 0    Magnesium Bisglycinate (MAG GLYCINATE) 100 MG TABS Take 120 mg by mouth      ondansetron (ZOFRAN ODT) 4 MG ODT tab       orphenadrine ER (NORFLEX) 100 MG 12 hr tablet Take 1 tablet (100 mg) by mouth 2 times daily as needed for muscle spasms 14 tablet 0    pravastatin (PRAVACHOL) 40 MG tablet       rosuvastatin (CRESTOR) 5 MG tablet Take 5 mg by mouth      sucralfate (CARAFATE) 1 GM tablet TAKE 1 TABLET BY MOUTH FOUR TIMES DAILY 1 HOUR BEFORE MEALS AND AT BEDTIME ON AN EMPTY STOMACH AS NEEDED         ALLERGIES:  Allergies   Allergen Reactions    Adhesive Tape Other (See Comments)     Reddness and irritation    Azithromycin Other (See Comments)    Entocort Ec [Budesonide] GI Disturbance     Nausea and vomiting    Sulfa Antibiotics Rash    Vancomycin Rash    Zantac [Ranitidine] Rash       FAMILY HISTORY:  Family History   Problem Relation Age of Onset    Thyroid Disease Mother 60        thyroid cancer    Gynecology Mother 40        pelvic pain    Gynecology Sister 30        pelvic pain    Alcohol/Drug Sister 37         alcholism    Cancer Maternal Grandmother         pancreatic CA    Breast Cancer Maternal Aunt         and maternal cousin; maternal aunt with second breast cancer    Breast Cancer Maternal Cousin     Cancer Other         mat GM's mom ovarian CA    Breast Cancer Other         told by a paternal aunt a lot of breast CA in their family, do not know details       SOCIAL HISTORY:   Social History     Socioeconomic History    Marital status:    Tobacco Use    Smoking status: Former     Current packs/day: 1.00     Average packs/day: 1 pack/day for 10.0 years (10.0 ttl pk-yrs)     Types: Cigarettes    Smokeless tobacco: Never   Substance and Sexual Activity    Alcohol use: Not Currently     Alcohol/week: 0.8 standard drinks of alcohol     Types: 1 Standard drinks or equivalent per week     Comment: two per month    Drug use: Never    Sexual  activity: Yes     Partners: Male     Social Determinants of Health     Financial Resource Strain: Low Risk  (11/9/2023)    Received from [facility], [facility]    Financial Resource Strain     Difficulty of Paying Living Expenses: 3   Food Insecurity: No Food Insecurity (11/9/2023)    Received from [facility], [facility]    Food Insecurity     Worried About Running Out of Food in the Last Year: 1   Transportation Needs: No Transportation Needs (11/9/2023)    Received from [facility], [facility]    Transportation Needs     Lack of Transportation (Medical): 1   Social Connections: Socially Integrated (11/9/2023)    Received from [facility], [facility]    Social Connections     Frequency of Communication with Friends and Family: 0   Housing Stability: Low Risk  (11/9/2023)    Received from [facility], [facility]    Housing Stability     Unable to Pay for Housing in the Last Year: 1       VITALS:  LMP 09/28/2015 (Approximate)     PHYSICAL EXAM:  Physical Exam  Vitals and nursing note reviewed.   Constitutional:       Appearance: Normal appearance.   HENT:      Head: Normocephalic and atraumatic.      Right Ear: External ear normal.      Left Ear: External ear normal.      Nose: Nose normal.      Mouth/Throat:      Mouth: Mucous membranes are moist.   Eyes:      Extraocular Movements: Extraocular movements intact.      Conjunctiva/sclera: Conjunctivae normal.      Pupils: Pupils are equal, round, and reactive to light.   Neck:      Comments: Full spontaneous motion of the neck with no midline cervical tenderness.  Mild left cervical paraspinous tenderness.   strength and strength  with palmar flexion and dorsiflexion as well as elbow flexion extension intact.  Sensation on the palmar dorsum of the hand intact.  Cardiovascular:      Rate and Rhythm: Normal rate and regular rhythm.   Pulmonary:      Effort: Pulmonary effort is normal.      Breath sounds: Normal breath sounds. No wheezing or rales.   Abdominal:      General: Abdomen is flat. There is no distension.      Palpations: Abdomen is soft.      Tenderness: There is no abdominal tenderness. There is no guarding.   Musculoskeletal:         General: Normal range of motion.      Cervical back: Normal range of motion and neck supple.      Right lower leg: No edema.      Left lower leg: No edema.   Lymphadenopathy:      Cervical: No cervical adenopathy.   Skin:     General: Skin is warm and dry.   Neurological:      General: No focal deficit present.      Mental Status: She is alert and oriented to person, place, and time. Mental status is at baseline.      Comments: No gross focal neurologic deficits   Psychiatric:         Mood and Affect: Mood normal.         Behavior: Behavior normal.         Thought Content: Thought content normal.     Phi Vargas M.D.  Emergency Medicine  [facility] EMERGENCY DEPARTMENT  [address] [place] [id number]  [phone]  Dept: [phone]       Phi Vargas MD  08/10/24 9541

## 2024-08-10 NOTE — ED TRIAGE NOTES
"Patient reports chronic bilateral arm numbness for 1 month, rates arm pains a 7. She also has left heel pain and a headache pain of 7. She thinks she has a pinched nerve in neck. HX of spinal stenosis, \"upper cervical\". She also reports 1 day of dizziness and blurred vision. Speech clear, A&O. \"I can't sleep\". She states she took a \"nerve block medication about 7PM yesterday\". Patient requesting a MRI. She sees a chiropractor, who has done xrays.     Triage Assessment (Adult)       Row Name 08/10/24 0457          Triage Assessment    Airway WDL WDL        Respiratory WDL    Respiratory WDL WDL        Skin Circulation/Temperature WDL    Skin Circulation/Temperature WDL WDL        Cardiac WDL    Cardiac WDL WDL        Peripheral/Neurovascular WDL    Peripheral Neurovascular WDL X     Capillary Refill, General less than/equal to 3 secs        Cognitive/Neuro/Behavioral WDL    Cognitive/Neuro/Behavioral WDL WDL        [place] Coma Scale    Best Eye Response 4-->(E4) spontaneous     Best Motor Response 6-->(M6) obeys commands     Best Verbal Response 5-->(V5) oriented     [place] Coma Scale Score 15                     "

## 2024-08-14 NOTE — TELEPHONE ENCOUNTER
Received voicemail from patient requesting to reschedule clinic appointment with Dr. Guallpa from 9/30/24 to a sooner date.     Patient stated they are in pain.    Routing to RN to reschedule.    Diogo Uriostegui on 8/14/2024 at 10:02 AM

## 2024-08-29 ENCOUNTER — HOSPITAL ENCOUNTER (OUTPATIENT)
Dept: ULTRASOUND IMAGING | Facility: HOSPITAL | Age: 59
Discharge: HOME | End: 2024-08-29
Attending: OBSTETRICS & GYNECOLOGY | Admitting: OBSTETRICS & GYNECOLOGY
Payer: COMMERCIAL

## 2024-08-29 ENCOUNTER — LAB (OUTPATIENT)
Dept: LAB | Facility: CLINIC | Age: 59
End: 2024-08-29
Attending: NURSE PRACTITIONER
Payer: COMMERCIAL

## 2024-08-29 ENCOUNTER — ONCOLOGY VISIT (OUTPATIENT)
Dept: ONCOLOGY | Facility: CLINIC | Age: 59
End: 2024-08-29
Attending: NURSE PRACTITIONER
Payer: COMMERCIAL

## 2024-08-29 VITALS
HEART RATE: 90 BPM | BODY MASS INDEX: 28.72 KG/M2 | SYSTOLIC BLOOD PRESSURE: 127 MMHG | RESPIRATION RATE: 20 BRPM | DIASTOLIC BLOOD PRESSURE: 81 MMHG | OXYGEN SATURATION: 97 % | WEIGHT: 172.6 LBS | TEMPERATURE: 98.4 F

## 2024-08-29 DIAGNOSIS — R10.12 LEFT UPPER QUADRANT PAIN: ICD-10-CM

## 2024-08-29 DIAGNOSIS — N95.0 POST-MENOPAUSAL BLEEDING: ICD-10-CM

## 2024-08-29 DIAGNOSIS — R35.0 URINARY FREQUENCY: ICD-10-CM

## 2024-08-29 DIAGNOSIS — N83.201 CYSTS OF BOTH OVARIES: ICD-10-CM

## 2024-08-29 DIAGNOSIS — R93.89 ENDOMETRIAL THICKENING ON ULTRASOUND: ICD-10-CM

## 2024-08-29 DIAGNOSIS — N94.89 ADNEXAL MASS: ICD-10-CM

## 2024-08-29 DIAGNOSIS — N94.89 ADNEXAL MASS: Primary | ICD-10-CM

## 2024-08-29 DIAGNOSIS — R10.2 PELVIC PAIN IN FEMALE: ICD-10-CM

## 2024-08-29 DIAGNOSIS — N83.202 CYSTS OF BOTH OVARIES: ICD-10-CM

## 2024-08-29 DIAGNOSIS — Z12.4 CERVICAL CANCER SCREENING: ICD-10-CM

## 2024-08-29 LAB
ALBUMIN UR-MCNC: NEGATIVE MG/DL
APPEARANCE UR: CLEAR
BILIRUB UR QL STRIP: NEGATIVE
COLOR UR AUTO: ABNORMAL
GLUCOSE UR STRIP-MCNC: NEGATIVE MG/DL
HGB UR QL STRIP: NEGATIVE
KETONES UR STRIP-MCNC: NEGATIVE MG/DL
LEUKOCYTE ESTERASE UR QL STRIP: NEGATIVE
NITRATE UR QL: NEGATIVE
PH UR STRIP: 6.5 [PH] (ref 5–7)
RBC URINE: <1 /HPF
SP GR UR STRIP: 1 (ref 1–1.03)
SQUAMOUS EPITHELIAL: <1 /HPF
UROBILINOGEN UR STRIP-MCNC: NORMAL MG/DL
WBC URINE: <1 /HPF

## 2024-08-29 PROCEDURE — 36415 COLL VENOUS BLD VENIPUNCTURE: CPT | Performed by: PATHOLOGY

## 2024-08-29 PROCEDURE — 76830 TRANSVAGINAL US NON-OB: CPT

## 2024-08-29 PROCEDURE — 76856 US EXAM PELVIC COMPLETE: CPT

## 2024-08-29 PROCEDURE — G0463 HOSPITAL OUTPT CLINIC VISIT: HCPCS | Performed by: NURSE PRACTITIONER

## 2024-08-29 PROCEDURE — 87624 HPV HI-RISK TYP POOLED RSLT: CPT | Performed by: NURSE PRACTITIONER

## 2024-08-29 PROCEDURE — 81001 URINALYSIS AUTO W/SCOPE: CPT | Performed by: NURSE PRACTITIONER

## 2024-08-29 PROCEDURE — G0145 SCR C/V CYTO,THINLAYER,RESCR: HCPCS | Performed by: NURSE PRACTITIONER

## 2024-08-29 PROCEDURE — 99417 PROLNG OP E/M EACH 15 MIN: CPT | Performed by: NURSE PRACTITIONER

## 2024-08-29 PROCEDURE — 99000 SPECIMEN HANDLING OFFICE-LAB: CPT | Performed by: PATHOLOGY

## 2024-08-29 PROCEDURE — 86304 IMMUNOASSAY TUMOR CA 125: CPT | Performed by: NURSE PRACTITIONER

## 2024-08-29 PROCEDURE — 99215 OFFICE O/P EST HI 40 MIN: CPT | Performed by: NURSE PRACTITIONER

## 2024-08-29 RX ORDER — ATORVASTATIN CALCIUM 20 MG/1
1 TABLET, FILM COATED ORAL DAILY
COMMUNITY
Start: 2024-07-24

## 2024-08-29 RX ORDER — PROGESTERONE 100 MG/1
100 CAPSULE ORAL AT BEDTIME
COMMUNITY

## 2024-08-29 RX ORDER — ESTRADIOL 0.05 MG/D
PATCH, EXTENDED RELEASE TRANSDERMAL
COMMUNITY
Start: 2024-04-25

## 2024-08-29 ASSESSMENT — PAIN SCALES - GENERAL: PAINLEVEL: MODERATE PAIN (5)

## 2024-08-29 NOTE — PROGRESS NOTES
Gynecologic Oncology Follow Up Note    RE: Roxana Lazaro   : 1965  PRONOUNS: she/her/hers  JOHANNA: 2024  GYNECOLOGIC ONCOLOGIST: Tr Guallpa MD    CC: Roxana Lazaro is a 59 year old female with a history of post-menopausal bleeding and a L adnexal mass presenting for an acute visit regarding pelvic pain    INTERVAL HISTORY:  Roxana comes to the clinic accompanied by her friend. She was seen in the ED on 24 for pelvic pain- imaging demonstrated a L ovarian cyst. Concern was for diverticulitis- CT recommended which she declined at that time. She is concerned that she still has diverticulitis.    She continues to have lower abdominopelvic pain- feels it may be improving somewhat. Also notes some intermittent discomfort to the left upper quadrant and umbilicus. Denies fevers, chills, nausea, or vomiting. States her BMs are regular and normal but later admits she has had some rectal discomfort and thinks she may have a hemorrhoid. Feels she is not urinating as well- having a weak urinary stream, voiding more frequently, and had an episode of dysuria a few days ago. She has been eating less- feels that she is grazing now rather than eating full meals, gets full quicker than she used to.    She does have occasional brown vaginal discharge but denies darby bleeding. She reports vaginal soreness that has been persisting- on HRT with estradiol patch and prometrium, had reached out to her gynecology team ([facility]) to raise the dose due to vaginal discomfort. Last Pap in Care Everywhere 2019, NILM, no HPV testing done.    She is concerned about a possible surgery as she feels she is not in as good of health as she was earlier in the year- has numbness in the fingers from a cervical spine etiology and was recommended to have surgery for this as well.      ONCOLOGY HISTORY:  Laparotomy with Dr. Vázquez in  (about) for a left pelvic mass- unknown pathology    11/10/23 Pelvic  US:  Impression  1. The uterus  is normal in appearance.  2. The right ovary was not identified in today's study.  3. The left ovary was noted to have a fluid filled cyst with septations,  5.3 x 4.4 x 4.4 cm size.  It was 3.2 cm in 2021.      1/29/24: Established with Dr. Guallpa for evaluation of post-menopausal bleeding    2/14/24: D&C. A. Endocervix, Curettage:  - Fragments of mucus, benign endometrium, and benign squamous epithelium     B. Endometrium, Curettage:  - Fragments of inactive endometrium with cystic glands  - Mucus and benign squamous epithelium  - Negative for hyperplasia, atypia, or malignancy  8/14/24: ED visit for LLQ pain  Imaging  US PELVIS COMPLETE TA AND TV    Result Date: 8/14/2024  For Patients: As a result of the 21st Century Cures Act, medical imaging exams and procedure reports are released immediately into your electronic medical record. You may view this report before your referring provider. If you have questions, please contact your health care provider. EXAM: US PELVIS COMPLETE TA AND TV LOCATION: [facility] [place] DATE: 8/14/2024 INDICATION: Pelvic Pain/tenderness COMPARISON: 11/10/23, 2/14/24 TECHNIQUE: Transabdominal scans were performed. Endovaginal ultrasound was performed to better visualize the adnexa. FINDINGS: UTERUS: 8.6 x 4.6 x 5.8 cm. Normal size and contour. Heterogeneous echotexture. Ill-defined heterogeneous mildly hyperechoic uterine junctional zone. Associated mild hypervascularity. Left anterior subserosal solid nodular lesion measuring 2.7 x 2.1 x 1.4 cm ENDOMETRIUM: 5 mm. Normal smooth endometrium. RIGHT OVARY: Not visualized due to technical factors. LEFT OVARY: 5.0 x 3.6 x 5.0 cm. Normal color and spectral Doppler flow demonstrated. Simple 3.7 x 3.5 x 3.0 cm cyst. No significant free fluid.     1. Simple 3.7 cm left ovarian cyst is likely physiologic. No follow-up is indicated if the patient is asymptomatic. If the patient is symptomatic, recommend  "ultrasound follow-up in 6-12 weeks. 2. No evidence for left ovarian torsion. 3. Nonvisualized right ovary. 4. Findings suspicious for uterine adenomyosis. 5. Small subserosal uterine fibroid.     Per ED notes, concern for diverticulitis- recommended CT, declined by patient.    Past Medical History:   Diagnosis Date    Depression     Endometriosis     Fatty liver     GERD (gastroesophageal reflux disease)     H. pylori infection     resolved    Hyperlipidemia     Palpitations     Polycystic ovaries     PONV (postoperative nausea and vomiting)     Sleep apnea     pt states \"mild\" no c-pap use    Spinal stenosis       Past Surgical History:   Procedure Laterality Date    AS RAD RESEC TONSIL/PILLARS      CHOLECYSTECTOMY      DILATION AND CURETTAGE N/A 2/14/2024    Procedure: DILATION AND CURETTAGE, UTERUS;  Surgeon: Tr Guallpa MD;  Location: [facility] OR    DILATION AND CURETTAGE, OPERATIVE HYSTEROSCOPY WITH MORCELLATOR, COMBINED N/A 12/04/2015    Procedure: COMBINED DILATION AND CURETTAGE, OPERATIVE HYSTEROSCOPY WITH MORCELLATOR;  Surgeon: Romelia Coelho MD;  Location: [facility] OR    ENDOSCOPY      lap choley  01/01/1994    left ovarian cystectomy  07/01/2003    complicated by wound infection, elevated CA-125, , endometriosis    OPERATIVE HYSTEROSCOPY WITH MORCELLATOR  12/20/2013    Procedure: OPERATIVE HYSTEROSCOPY WITH MORCELLATOR;  Hysteroscopy,   Polypectomy with  Myosure, removal of labial skin tag, D  & C;  Surgeon: Romelia Coelho MD;  Location: [facility] OR     Social History     Socioeconomic History    Marital status:    Tobacco Use    Smoking status: Former     Current packs/day: 1.00     Average packs/day: 1 pack/day for 10.0 years (10.0 ttl pk-yrs)     Types: Cigarettes    Smokeless tobacco: Never   Substance and Sexual Activity    Alcohol use: Not Currently     Alcohol/week: 0.8 standard drinks of alcohol     Types: 1 Standard drinks or equivalent per week     Comment: two per month    Drug use: " Never    Sexual activity: Yes     Partners: Male     Social Determinants of Health     Financial Resource Strain: Low Risk  (2023)    Received from [facility], [facility]    Financial Resource Strain     Difficulty of Paying Living Expenses: 3   Food Insecurity: No Food Insecurity (2023)    Received from [facility] [place] [facility], [facility]    Food Insecurity     Worried About Running Out of Food in the Last Year: 1   Transportation Needs: No Transportation Needs (2023)    [facility], [facility]    Transportation Needs     Lack of Transportation (Medical): 1   Social Connections: Socially Integrated (2023)    [facility], [facility]    Social Connections     Frequency of Communication with Friends and Family: 0   Housing Stability: Low Risk  (2023)    [facility], [facility]    Housing Stability     Unable to Pay for Housing in the Last Year: 1      Family History   Problem Relation Age of Onset    Thyroid Disease Mother 60        thyroid cancer    Gynecology Mother 40        pelvic pain    Gynecology Sister 30        pelvic pain    Alcohol/Drug Sister 37         alcholism    Cancer Maternal Grandmother         pancreatic CA    Breast Cancer Maternal Aunt         and maternal cousin; maternal aunt with second breast cancer    Breast Cancer Maternal Cousin     Cancer Other         mat GM's mom ovarian CA    Breast Cancer Other         told by a paternal aunt a lot of breast CA in their family, do not know details      Current Outpatient Medications   Medication Sig Dispense Refill    acetaminophen (TYLENOL) 325 MG tablet Take 2  tablets (650 mg) by mouth every 6 hours as needed for mild pain 24 tablet 0    cholecalciferol 50 MCG (2000 UT) tablet Take 2,000 Units by mouth daily      gabapentin (NEURONTIN) 300 MG capsule Take 1 capsule (300 mg) by mouth 3 times daily 90 capsule 0    ibuprofen (ADVIL/MOTRIN) 600 MG tablet Take 1 tablet (600 mg) by mouth every 6 hours as needed for other (mild and/or inflammatory pain.) 12 tablet 0    Magnesium Bisglycinate (MAG GLYCINATE) 100 MG TABS Take 120 mg by mouth      ondansetron (ZOFRAN ODT) 4 MG ODT tab       orphenadrine ER (NORFLEX) 100 MG 12 hr tablet Take 1 tablet (100 mg) by mouth 2 times daily as needed for muscle spasms 14 tablet 0    pantoprazole (PROTONIX) 40 MG EC tablet Take 1 tablet (40 mg) by mouth daily for 30 doses 30 tablet 0    pravastatin (PRAVACHOL) 40 MG tablet       rosuvastatin (CRESTOR) 5 MG tablet Take 5 mg by mouth      sucralfate (CARAFATE) 1 GM tablet TAKE 1 TABLET BY MOUTH FOUR TIMES DAILY 1 HOUR BEFORE MEALS AND AT BEDTIME ON AN EMPTY STOMACH AS NEEDED       No current facility-administered medications for this visit.      Allergies   Allergen Reactions    Adhesive Tape Other (See Comments)     Reddness and irritation    Azithromycin Other (See Comments)    Entocort Ec [Budesonide] GI Disturbance     Nausea and vomiting    Sulfa Antibiotics Rash    Vancomycin Rash    Zantac [Ranitidine] Rash          OBJECTIVE:    PHYSICAL EXAM:  /81 (BP Location: Right arm, Patient Position: Sitting, Cuff Size: Adult Regular)   Pulse 90   Temp 98.4  F (36.9  C) (Oral)   Resp 20   Wt 78.3 kg (172 lb 9.6 oz)   LMP 09/28/2015 (Approximate)   SpO2 97%   BMI 28.72 kg/m         CONSTITUTIONAL: Alert non-toxic appearing female in no acute distress, appears nervous  HEAD: Normocephalic, atraumatic  NECK: Neck supple without lymphadenopathy  RESPIRATORY: Lungs clear to auscultation, respirations unlabored  CV: Regular rate and rhythm, S1S2, no clicks, murmurs, rubs, or gallops;  bilateral lower extremities with trace edema to ankles  GASTROINTESTINAL: Abdomen soft, non-distended but tender to LUQ, umbilical region, and LLQ. Palpable fullness to LLQ. No palpable organomegaly.   GENITOURINARY: External genitalia and urethral meatus pink without lesions, masses, or excoriation. Vagina pink and smooth without masses or lesions. Cervix without visible abnormality, Pap obtained. No CMT. Palpable fullness to L vaginal fornix, tender. Rectovaginal exam confirms these findings.  LYMPHATIC: Cervical, supraclavicular, and inguinal nodes without lymphadenopathy  MUSCULOSKELETAL: Moves all extremities, no obvious muscle wasting  NEUROLOGIC: No gross deficits, normal gait  SKIN: Appropriate color for race, warm and dry, no rashes or lesions to unclothed skin  PSYCHIATRIC: Pleasant and interactive, affect bright, makes appropriate eye contact, thought process linear    DATA:    Weight trend:  Wt Readings from Last 5 Encounters:   08/10/24 78 kg (172 lb)   03/04/24 80 kg (176 lb 6.4 oz)   02/14/24 78.2 kg (172 lb 6.4 oz)   01/29/24 79.6 kg (175 lb 6.4 oz)   10/21/22 77.1 kg (170 lb)        Labs:  UA, Pap/HPV,  pending       Imaging:  CT AP pending    ASSESSMENT/PLAN:    LLQ pain: Persistent LLQ pain, palpable mass- known adnexal mass that appeared 2cm larger on ultrasound from 8/14-->8/29 along with thickened endometrial stripe (6mm). Associated with early satiety, persistent bloating, LUQ discomfort. She remains concerned that this could be diverticulitis- discussed these are likely two separate entities, but both require further evaluation. Will obtain STAT abdomen/pelvis CT, urinalysis, Pap/HPV, and . Requested that her appt with Dr. Guallpa be moved up to discuss next steps. To seek ED for worsening abdominal pain.  Reviewed alarm s/sxs and when to seek further care  Patient verbalized understanding of and agreement with plan    MDM:  55 minutes spent on date of service on visit, including  chart review, face to face visit, documentation, and coordination of care.    CASTILLO Velazquez, CNP  Division of Gynecologic Oncology        able to coordinate with ACT team  able to coordinate with ACT team

## 2024-08-29 NOTE — LETTER
2024      Roxana Lazaro  [address]  [place] [address]      Dear Colleague,    Thank you for referring your patient, Roxana Lazaro, to the [facility]. Please see a copy of my visit note below.    Gynecologic Oncology Follow Up Note    RE: Roxana Lazaro   : 1965  PRONOUNS: she/her/hers  JOHANNA: 2024  GYNECOLOGIC ONCOLOGIST: Tr Guallpa MD    CC: Roxana Lazaro is a 59 year old female with a history of post-menopausal bleeding and a L adnexal mass presenting for an acute visit regarding pelvic pain    INTERVAL HISTORY:  Roxana comes to the clinic accompanied by her friend. She was seen in the ED on 24 for pelvic pain- imaging demonstrated a L ovarian cyst. Concern was for diverticulitis- CT recommended which she declined at that time. She is concerned that she still has diverticulitis.    She continues to have lower abdominopelvic pain- feels it may be improving somewhat. Also notes some intermittent discomfort to the left upper quadrant and umbilicus. Denies fevers, chills, nausea, or vomiting. States her BMs are regular and normal but later admits she has had some rectal discomfort and thinks she may have a hemorrhoid. Feels she is not urinating as well- having a weak urinary stream, voiding more frequently, and had an episode of dysuria a few days ago. She has been eating less- feels that she is grazing now rather than eating full meals, gets full quicker than she used to.    She does have occasional brown vaginal discharge but denies darby bleeding. She reports vaginal soreness that has been persisting- on HRT with estradiol patch and prometrium, had reached out to her gynecology team ([facility]) to raise the dose due to vaginal discomfort. Last Pap in Care Everywhere 2019, NILM, no HPV testing done.    She is concerned about a possible surgery as she feels she is not in as good of health as she was earlier in the year- has  numbness in the fingers from a cervical spine etiology and was recommended to have surgery for this as well.      ONCOLOGY HISTORY:  Laparotomy with Dr. Vázquez in 2006 (about) for a left pelvic mass- unknown pathology    11/10/23 Pelvic US:  Impression  1. The uterus  is normal in appearance.  2. The right ovary was not identified in today's study.  3. The left ovary was noted to have a fluid filled cyst with septations,  5.3 x 4.4 x 4.4 cm size.  It was 3.2 cm in 2021.      1/29/24: Established with Dr. Guallpa for evaluation of post-menopausal bleeding    2/14/24: D&C. A. Endocervix, Curettage:  - Fragments of mucus, benign endometrium, and benign squamous epithelium     B. Endometrium, Curettage:  - Fragments of inactive endometrium with cystic glands  - Mucus and benign squamous epithelium  - Negative for hyperplasia, atypia, or malignancy  8/14/24: ED visit for LLQ pain  Imaging  US PELVIS COMPLETE TA AND TV    Result Date: 8/14/2024  For Patients: As a result of the 21st Century Cures Act, medical imaging exams and procedure reports are released immediately into your electronic medical record. You may view this report before your referring provider. If you have questions, please contact your health care provider. EXAM: US PELVIS COMPLETE TA AND TV LOCATION: [facility] [place] DATE: 8/14/2024 INDICATION: Pelvic Pain/tenderness COMPARISON: 11/10/23, 2/14/24 TECHNIQUE: Transabdominal scans were performed. Endovaginal ultrasound was performed to better visualize the adnexa. FINDINGS: UTERUS: 8.6 x 4.6 x 5.8 cm. Normal size and contour. Heterogeneous echotexture. Ill-defined heterogeneous mildly hyperechoic uterine junctional zone. Associated mild hypervascularity. Left anterior subserosal solid nodular lesion measuring 2.7 x 2.1 x 1.4 cm ENDOMETRIUM: 5 mm. Normal smooth endometrium. RIGHT OVARY: Not visualized due to technical factors. LEFT OVARY: 5.0 x 3.6 x 5.0 cm. Normal color and spectral Doppler flow  "demonstrated. Simple 3.7 x 3.5 x 3.0 cm cyst. No significant free fluid.     1. Simple 3.7 cm left ovarian cyst is likely physiologic. No follow-up is indicated if the patient is asymptomatic. If the patient is symptomatic, recommend ultrasound follow-up in 6-12 weeks. 2. No evidence for left ovarian torsion. 3. Nonvisualized right ovary. 4. Findings suspicious for uterine adenomyosis. 5. Small subserosal uterine fibroid.     Per ED notes, concern for diverticulitis- recommended CT, declined by patient.    Past Medical History:   Diagnosis Date     Depression      Endometriosis      Fatty liver      GERD (gastroesophageal reflux disease)      H. pylori infection     resolved     Hyperlipidemia      Palpitations      Polycystic ovaries      PONV (postoperative nausea and vomiting)      Sleep apnea     pt states \"mild\" no c-pap use     Spinal stenosis       Past Surgical History:   Procedure Laterality Date     AS RAD RESEC TONSIL/PILLARS       CHOLECYSTECTOMY       DILATION AND CURETTAGE N/A 2/14/2024    Procedure: DILATION AND CURETTAGE, UTERUS;  Surgeon: Tr Guallpa MD;  Location: [facility]     DILATION AND CURETTAGE, OPERATIVE HYSTEROSCOPY WITH MORCELLATOR, COMBINED N/A 12/04/2015    Procedure: COMBINED DILATION AND CURETTAGE, OPERATIVE HYSTEROSCOPY WITH MORCELLATOR;  Surgeon: Romelia Coelho MD;  Location: [facility] OR     ENDOSCOPY       lap choley  01/01/1994     left ovarian cystectomy  07/01/2003    complicated by wound infection, elevated CA-125, , endometriosis     OPERATIVE HYSTEROSCOPY WITH MORCELLATOR  12/20/2013    Procedure: OPERATIVE HYSTEROSCOPY WITH MORCELLATOR;  Hysteroscopy,   Polypectomy with  Myosure, removal of labial skin tag, D  & C;  Surgeon: Romelia Coelho MD;  Location: [facility] OR     Social History     Socioeconomic History     Marital status:    Tobacco Use     Smoking status: Former     Current packs/day: 1.00     Average packs/day: 1 pack/day for 10.0 years (10.0 ttl pk-yrs) "     Types: Cigarettes     Smokeless tobacco: Never   Substance and Sexual Activity     Alcohol use: Not Currently     Alcohol/week: 0.8 standard drinks of alcohol     Types: 1 Standard drinks or equivalent per week     Comment: two per month     Drug use: Never     Sexual activity: Yes     Partners: Male     Social Determinants of Health     Financial Resource Strain: Low Risk  (2023)    Received from [facility], [facility]    Financial Resource Strain      Difficulty of Paying Living Expenses: 3   Food Insecurity: No Food Insecurity (2023)    Received from [facility], [facility]    Food Insecurity      Worried About Running Out of Food in the Last Year: 1   Transportation Needs: No Transportation Needs (2023)    [facility]    Transportation Needs      Lack of Transportation (Medical): 1   Social Connections: Socially Integrated (2023)    Received from [facility] [employer] [facility], [facility]    Social Connections      Frequency of Communication with Friends and Family: 0   Housing Stability: Low Risk  (2023)    [facility]    Housing Stability      Unable to Pay for Housing in the Last Year: 1      Family History   Problem Relation Age of Onset     Thyroid Disease Mother 60        thyroid cancer     Gynecology Mother 40        pelvic pain     Gynecology Sister 30        pelvic pain     Alcohol/Drug Sister 37         alcholism     Cancer Maternal Grandmother         pancreatic CA     Breast Cancer Maternal Aunt         and maternal cousin; maternal aunt with second breast cancer     Breast Cancer Maternal Cousin       Cancer Other         mat LORRAINE's mom ovarian CA     Breast Cancer Other         told by a paternal aunt a lot of breast CA in their family, do not know details      Current Outpatient Medications   Medication Sig Dispense Refill     acetaminophen (TYLENOL) 325 MG tablet Take 2 tablets (650 mg) by mouth every 6 hours as needed for mild pain 24 tablet 0     cholecalciferol 50 MCG (2000 UT) tablet Take 2,000 Units by mouth daily       gabapentin (NEURONTIN) 300 MG capsule Take 1 capsule (300 mg) by mouth 3 times daily 90 capsule 0     ibuprofen (ADVIL/MOTRIN) 600 MG tablet Take 1 tablet (600 mg) by mouth every 6 hours as needed for other (mild and/or inflammatory pain.) 12 tablet 0     Magnesium Bisglycinate (MAG GLYCINATE) 100 MG TABS Take 120 mg by mouth       ondansetron (ZOFRAN ODT) 4 MG ODT tab        orphenadrine ER (NORFLEX) 100 MG 12 hr tablet Take 1 tablet (100 mg) by mouth 2 times daily as needed for muscle spasms 14 tablet 0     pantoprazole (PROTONIX) 40 MG EC tablet Take 1 tablet (40 mg) by mouth daily for 30 doses 30 tablet 0     pravastatin (PRAVACHOL) 40 MG tablet        rosuvastatin (CRESTOR) 5 MG tablet Take 5 mg by mouth       sucralfate (CARAFATE) 1 GM tablet TAKE 1 TABLET BY MOUTH FOUR TIMES DAILY 1 HOUR BEFORE MEALS AND AT BEDTIME ON AN EMPTY STOMACH AS NEEDED       No current facility-administered medications for this visit.      Allergies   Allergen Reactions     Adhesive Tape Other (See Comments)     Reddness and irritation     Azithromycin Other (See Comments)     Entocort Ec [Budesonide] GI Disturbance     Nausea and vomiting     Sulfa Antibiotics Rash     Vancomycin Rash     Zantac [Ranitidine] Rash          OBJECTIVE:    PHYSICAL EXAM:  /81 (BP Location: Right arm, Patient Position: Sitting, Cuff Size: Adult Regular)   Pulse 90   Temp 98.4  F (36.9  C) (Oral)   Resp 20   Wt 78.3 kg (172 lb 9.6 oz)   LMP 09/28/2015 (Approximate)   SpO2 97%   BMI 28.72 kg/m         CONSTITUTIONAL:  Alert non-toxic appearing female in no acute distress, appears nervous  HEAD: Normocephalic, atraumatic  NECK: Neck supple without lymphadenopathy  RESPIRATORY: Lungs clear to auscultation, respirations unlabored  CV: Regular rate and rhythm, S1S2, no clicks, murmurs, rubs, or gallops; bilateral lower extremities with trace edema to ankles  GASTROINTESTINAL: Abdomen soft, non-distended but tender to LUQ, umbilical region, and LLQ. Palpable fullness to LLQ. No palpable organomegaly.   GENITOURINARY: External genitalia and urethral meatus pink without lesions, masses, or excoriation. Vagina pink and smooth without masses or lesions. Cervix without visible abnormality, Pap obtained. No CMT. Palpable fullness to L vaginal fornix, tender. Rectovaginal exam confirms these findings.  LYMPHATIC: Cervical, supraclavicular, and inguinal nodes without lymphadenopathy  MUSCULOSKELETAL: Moves all extremities, no obvious muscle wasting  NEUROLOGIC: No gross deficits, normal gait  SKIN: Appropriate color for race, warm and dry, no rashes or lesions to unclothed skin  PSYCHIATRIC: Pleasant and interactive, affect bright, makes appropriate eye contact, thought process linear    DATA:    Weight trend:  Wt Readings from Last 5 Encounters:   08/10/24 78 kg (172 lb)   03/04/24 80 kg (176 lb 6.4 oz)   02/14/24 78.2 kg (172 lb 6.4 oz)   01/29/24 79.6 kg (175 lb 6.4 oz)   10/21/22 77.1 kg (170 lb)        Labs:  UA, Pap/HPV,  pending       Imaging:  CT AP pending    ASSESSMENT/PLAN:    LLQ pain: Persistent LLQ pain, palpable mass- known adnexal mass that appeared 2cm larger on ultrasound from 8/14-->8/29 along with thickened endometrial stripe (6mm). Associated with early satiety, persistent bloating, LUQ discomfort. She remains concerned that this could be diverticulitis- discussed these are likely two separate entities, but both require further evaluation. Will obtain STAT abdomen/pelvis CT, urinalysis, Pap/HPV, and . Requested  that her appt with Dr. Guallpa be moved up to discuss next steps. To seek ED for worsening abdominal pain.  Reviewed alarm s/sxs and when to seek further care  Patient verbalized understanding of and agreement with plan    MDM:  55 minutes spent on date of service on visit, including chart review, face to face visit, documentation, and coordination of care.    CASTILLO Velazquez, CNP  Division of Gynecologic Oncology           Again, thank you for allowing me to participate in the care of your patient.        Sincerely,        CASTILLO Velazquez CNP

## 2024-08-29 NOTE — PATIENT INSTRUCTIONS
Getting the , Pap, and urine results  CT scan ordered ASAP  You have an appt with Dr. Guallpa on 10/3, but this will likely be moved up  If you develop worsening abdominal or pelvic pain, I want you to go to the ED here at [address] [place] [address]

## 2024-08-29 NOTE — NURSING NOTE
"Oncology Rooming Note    August 29, 2024 4:14 PM   Roxana Lazaro is a 59 year old female who presents for:    Chief Complaint   Patient presents with    Oncology Clinic Visit     Cysts of both ovaries cyst of uterus      Initial Vitals: /81 (BP Location: Right arm, Patient Position: Sitting, Cuff Size: Adult Regular)   Pulse 90   Temp 98.4  F (36.9  C) (Oral)   Resp 20   Wt 78.3 kg (172 lb 9.6 oz)   LMP 09/28/2015 (Approximate)   SpO2 97%   BMI 28.72 kg/m   Estimated body mass index is 28.72 kg/m  as calculated from the following:    Height as of 8/10/24: 1.651 m (5' 5\").    Weight as of this encounter: 78.3 kg (172 lb 9.6 oz). Body surface area is 1.89 meters squared.  Moderate Pain (5) Comment: Data Unavailable   Patient's last menstrual period was 09/28/2015 (approximate).  Allergies reviewed: Yes  Medications reviewed: Yes    Medications: Medication refills not needed today.  Pharmacy name entered into EPIC:    [id number] - [place], [place] - [address] AT [facility] [address]    Frailty Screening:   Is the patient here for a new oncology consult visit in cancer care? 2. No      Clinical concerns:  none      Ayleen Harvey              "

## 2024-08-30 ENCOUNTER — HOSPITAL ENCOUNTER (OUTPATIENT)
Dept: CT IMAGING | Facility: HOSPITAL | Age: 59
Discharge: HOME | End: 2024-08-30
Attending: NURSE PRACTITIONER | Admitting: NURSE PRACTITIONER
Payer: COMMERCIAL

## 2024-08-30 DIAGNOSIS — R10.2 PELVIC PAIN IN FEMALE: ICD-10-CM

## 2024-08-30 DIAGNOSIS — N94.89 ADNEXAL MASS: ICD-10-CM

## 2024-08-30 DIAGNOSIS — R10.12 LEFT UPPER QUADRANT PAIN: ICD-10-CM

## 2024-08-30 DIAGNOSIS — K57.92 ACUTE DIVERTICULITIS: Primary | ICD-10-CM

## 2024-08-30 LAB
CANCER AG125 SERPL-ACNC: 20 U/ML
HPV HR 12 DNA CVX QL NAA+PROBE: NEGATIVE
HPV16 DNA CVX QL NAA+PROBE: NEGATIVE
HPV18 DNA CVX QL NAA+PROBE: NEGATIVE
HUMAN PAPILLOMA VIRUS FINAL DIAGNOSIS: NORMAL

## 2024-08-30 PROCEDURE — 74177 CT ABD & PELVIS W/CONTRAST: CPT

## 2024-08-30 PROCEDURE — 250N000009 HC RX 250: Performed by: NURSE PRACTITIONER

## 2024-08-30 PROCEDURE — 250N000011 HC RX IP 250 OP 636: Performed by: NURSE PRACTITIONER

## 2024-08-30 RX ORDER — IOPAMIDOL 755 MG/ML
84 INJECTION, SOLUTION INTRAVASCULAR ONCE
Status: COMPLETED | OUTPATIENT
Start: 2024-08-30 | End: 2024-08-30

## 2024-08-30 RX ADMIN — SODIUM CHLORIDE 90 ML: 9 INJECTION, SOLUTION INTRAVENOUS at 11:22

## 2024-08-30 RX ADMIN — IOPAMIDOL 84 ML: 755 INJECTION, SOLUTION INTRAVENOUS at 11:20

## 2024-08-30 NOTE — RESULT ENCOUNTER NOTE
Called Roxana- ETELVINA adnexal mass slightly increased since 2023. Acute diverticulitis- this is more likely to be the cause of her discomfort. Given that this has been ongoing for at least two weeks, will treat with antibiotics- allergy to metronidazole, will treat with Augmentin TID x14 days. Recommend follow up with her PCP within the next week. Discussed fluids, liquid diet vs low residue, and to seek emergency care for any worsening symptoms. May keep her appt on 10/3 with Dr. Guallpa as scheduled for follow up of adnexal mass.  low at 20.  CASTILLO Velazquez CNP on 8/30/2024 at 2:57 PM

## 2024-09-04 ENCOUNTER — MYC MEDICAL ADVICE (OUTPATIENT)
Dept: ONCOLOGY | Facility: CLINIC | Age: 59
End: 2024-09-04
Payer: COMMERCIAL

## 2024-09-04 LAB
BKR AP ASSOCIATED HPV REPORT: NORMAL
BKR LAB AP GYN ADEQUACY: NORMAL
BKR LAB AP GYN INTERPRETATION: NORMAL
BKR LAB AP LMP: NORMAL
BKR LAB AP PREVIOUS ABNORMAL: NORMAL
PATH REPORT.COMMENTS IMP SPEC: NORMAL
PATH REPORT.COMMENTS IMP SPEC: NORMAL
PATH REPORT.RELEVANT HX SPEC: NORMAL

## 2024-09-04 NOTE — TELEPHONE ENCOUNTER
Reviewed pt concerns with NP  This is a PCP issue, definitely address with them on Friday  Ok to stop augmentin if this is making sx worse  Explained rx for antibiotics not typically given for this diverticulitis any more but due to how symptomatic pt was that was the reason  Go to ER if fever develops or pain is severe

## 2024-09-07 NOTE — PROGRESS NOTES
RE: Roxana Lazaro   : 1965  PRONOUNS: she/her/hers  JOHANNA: 2024  GYNECOLOGIC ONCOLOGIST: Tr Guallpa MD     CC: Roxana Lazaro is a 59 year old female with a history of post-menopausal bleeding and a L adnexal mass presenting for an acute visit regarding pelvic pain     INTERVAL HISTORY:  Roxana comes to the clinic accompanied by her friend. She was seen in the ED on 2024  for pelvic pain- imaging demonstrated a L ovarian cyst. Concern was for diverticulitis- CT recommended which she declined at that time. She is concerned that she still has diverticulitis.     She continues to have lower abdominopelvic pain- feels it may be improving somewhat. Also notes some intermittent discomfort to the left upper quadrant and umbilicus. Denies fevers, chills, nausea, or vomiting. States her BMs are regular and normal but later admits she has had some rectal discomfort and thinks she may have a hemorrhoid. Feels she is not urinating as well- having a weak urinary stream, voiding more frequently, and had an episode of dysuria a few days ago. She has been eating less- feels that she is grazing now rather than eating full meals, gets full quicker than she used to.     She does have occasional brown vaginal discharge but denies darby bleeding. She reports vaginal soreness that has been persisting- on HRT with estradiol patch and prometrium, had reached out to her gynecology team ([facility]) to raise the dose due to vaginal discomfort. Last Pap in [facility] Every[facility] 2019, NILM, no HPV testing done.     She is currently on treatment for diverticulitis and reports her primary MD suggested she does not consider surgery until this is resolved.      ONCOLOGY HISTORY:  Laparotomy with Dr. Vázquez in  (about) for a left pelvic mass- unknown pathology     11/10/23 Pelvic US:  Impression  1. The uterus  is normal in appearance.  2. The right ovary was not identified in today's study.  3. The left ovary was  noted to have a fluid filled cyst with septations,  5.3 x 4.4 x 4.4 cm size.  It was 3.2 cm in 2021.       1/29/24: Established with Dr. Guallpa for evaluation of post-menopausal bleeding     2/14/24: D&C. A. Endocervix, Curettage:  - Fragments of mucus, benign endometrium, and benign squamous epithelium     B. Endometrium, Curettage:  - Fragments of inactive endometrium with cystic glands  - Mucus and benign squamous epithelium  - Negative for hyperplasia, atypia, or malignancy    8/14/24: ED visit for LLQ pain  Imaging  US PELVIS COMPLETE TA AND TV    Result Date: 8/14/2024  For Patients: As a result of the 21st Century Cures Act, medical imaging exams and procedure reports are released immediately into your electronic medical record. You may view this report before your referring provider. If you have questions, please contact your health care provider. EXAM: US PELVIS COMPLETE TA AND TV LOCATION: [facility] DATE: 8/14/2024 INDICATION: Pelvic Pain/tenderness COMPARISON: 11/10/23, 2/14/24 TECHNIQUE: Transabdominal scans were performed. Endovaginal ultrasound was performed to better visualize the adnexa. FINDINGS: UTERUS: 8.6 x 4.6 x 5.8 cm. Normal size and contour. Heterogeneous echotexture. Ill-defined heterogeneous mildly hyperechoic uterine junctional zone. Associated mild hypervascularity. Left anterior subserosal solid nodular lesion measuring 2.7 x 2.1 x 1.4 cm ENDOMETRIUM: 5 mm. Normal smooth endometrium. RIGHT OVARY: Not visualized due to technical factors. LEFT OVARY: 5.0 x 3.6 x 5.0 cm. Normal color and spectral Doppler flow demonstrated. Simple 3.7 x 3.5 x 3.0 cm cyst. No significant free fluid.     1. Simple 3.7 cm left ovarian cyst is likely physiologic. No follow-up is indicated if the patient is asymptomatic. If the patient is symptomatic, recommend ultrasound follow-up in 6-12 weeks. 2. No evidence for left ovarian torsion. 3. Nonvisualized right ovary. 4. Findings suspicious for  "uterine adenomyosis. 5. Small subserosal uterine fibroid.      Per ED notes, concern for diverticulitis- recommended CT, declined by patient.    8/30/24 CT  IMPRESSION:   1.  Acute diverticulitis of the proximal sigmoid colon. No perforation or abscess.  2.  4.6 x 3.4 cm complex cystic structure within the left adnexa. This is slightly increased in size from March 2023 CT scan. Gynecologic consultation is recommended.  3.  Uterine fibroids.  4.  Small fat-containing umbilical hernia and small fat-containing supraumbilical ventral hernia.         Past Medical History        Past Medical History:   Diagnosis Date    Depression      Endometriosis      Fatty liver      GERD (gastroesophageal reflux disease)      H. pylori infection       resolved    Hyperlipidemia      Palpitations      Polycystic ovaries      PONV (postoperative nausea and vomiting)      Sleep apnea       pt states \"mild\" no c-pap use    Spinal stenosis           Past Surgical History         Past Surgical History:   Procedure Laterality Date    AS RAD RESEC TONSIL/PILLARS        CHOLECYSTECTOMY        DILATION AND CURETTAGE N/A 2/14/2024     Procedure: DILATION AND CURETTAGE, UTERUS;  Surgeon: Tr Guallpa MD;  [facility]    DILATION AND CURETTAGE, OPERATIVE HYSTEROSCOPY WITH MORCELLATOR, COMBINED N/A 12/04/2015     Procedure: COMBINED DILATION AND CURETTAGE, OPERATIVE HYSTEROSCOPY WITH MORCELLATOR;  Surgeon: Romelia Coelho MD;  Location: [facility]    ENDOSCOPY        lap choley   01/01/1994    left ovarian cystectomy   07/01/2003     complicated by wound infection, elevated CA-125, , endometriosis    OPERATIVE HYSTEROSCOPY WITH MORCELLATOR   12/20/2013     Procedure: OPERATIVE HYSTEROSCOPY WITH MORCELLATOR;  Hysteroscopy,   Polypectomy with  Myosure, removal of labial skin tag, D  & C;  Surgeon: Romelia Coelho MD;  Location: [facility] OR         Social History            Socioeconomic History    Marital status:    Tobacco Use    " Smoking status: Former       Current packs/day: 1.00       Average packs/day: 1 pack/day for 10.0 years (10.0 ttl pk-yrs)       Types: Cigarettes    Smokeless tobacco: Never   Substance and Sexual Activity    Alcohol use: Not Currently       Alcohol/week: 0.8 standard drinks of alcohol       Types: 1 Standard drinks or equivalent per week       Comment: two per month    Drug use: Never    Sexual activity: Yes       Partners: Male      Social Determinants of Health           Financial Resource Strain: Low Risk  (2023)     [facility]     Financial Resource Strain      Difficulty of Paying Living Expenses: 3   Food Insecurity: No Food Insecurity (2023)     [facility], [facility]     Food Insecurity      Worried About Running Out of Food in the Last Year: 1   Transportation Needs: No Transportation Needs (2023)     [facility], [facility]     Transportation Needs      Lack of Transportation (Medical): 1   Social Connections: Socially Integrated (2023)     [facility]     Social Connections      Frequency of Communication with Friends and Family: 0   Housing Stability: Low Risk  (2023)     [facility], [facility]     Housing Stability      Unable to Pay for Housing in the Last Year: 1      Family History         Family History   Problem Relation Age of Onset    Thyroid Disease Mother 60         thyroid cancer    Gynecology Mother 40         pelvic pain    Gynecology Sister 30         pelvic pain    Alcohol/Drug Sister 37          alcholism    Cancer Maternal Grandmother            pancreatic CA    Breast Cancer Maternal Aunt           and maternal cousin; maternal aunt with second breast cancer    Breast Cancer Maternal Cousin      Cancer Other           mat GM's mom ovarian CA    Breast Cancer Other           told by a paternal aunt a lot of breast CA in their family, do not know details            Allergies         Allergies   Allergen Reactions    Adhesive Tape Other (See Comments)       Reddness and irritation    Azithromycin Other (See Comments)    Entocort Ec [Budesonide] GI Disturbance       Nausea and vomiting    Sulfa Antibiotics Rash    Vancomycin Rash    Zantac [Ranitidine] Rash               OBJECTIVE:     PHYSICAL EXAM:  /81 (BP Location: Right arm, Patient Position: Sitting, Cuff Size: Adult Regular)   Pulse 90   Temp 98.4  F (36.9  C) (Oral)   Resp 20   Wt 78.3 kg (172 lb 9.6 oz)   LMP 09/28/2015 (Approximate)   SpO2 97%   BMI 28.72 kg/m          CONSTITUTIONAL: Alert non-toxic appearing female in no acute distress, appears nervous  HEAD: Normocephalic, atraumatic  NECK: Neck supple without lymphadenopathy  RESPIRATORY: Lungs clear to auscultation, respirations unlabored  CV: Regular rate and rhythm, S1S2, no clicks, murmurs, rubs, or gallops; bilateral lower extremities with trace edema to ankles  GASTROINTESTINAL: Abdomen soft, non-distended but tender to LUQ, umbilical region, and LLQ. Palpable fullness to LLQ. No palpable organomegaly.   GENITOURINARY: External genitalia and urethral meatus pink without lesions, masses, or excoriation. Vagina pink and smooth without masses or lesions. Cervix without visible abnormality. Previous tenderness minimally in evidence.  No appreciable masses, no peritoneal sudding  LYMPHATIC: Cervical, supraclavicular, and inguinal nodes without lymphadenopathy  MUSCULOSKELETAL: Moves all extremities, no obvious muscle wasting  NEUROLOGIC: No gross deficits, normal gait  SKIN: Appropriate color for race, warm and dry, no  rashes or lesions to unclothed skin  PSYCHIATRIC: Pleasant and interactive, affect bright, makes appropriate eye contact, thought process linear            ASSESSMENT/PLAN:     LLQ pain / complex mass- known adnexal mass that appeared 2cm larger on ultrasound from 8/14-->8/29. Mass has waxed and waned in presence of inflammation, low CA-125 and no new CT fidnings.  EMS thickened but no bleeding and on HRT with negative D&C in 2/2024.  WE have discussed that malignancy is neither confirmed nor excluded.  WE have discussed the pros and cons of surgery an observation at length.  She is leaning toward surgery, but not yet committed, and requests time to consider these options and address her current diverticulitis.  This is reasonable - she will call when her decision is made  Reviewed alarm s/sxs and when to seek further care  Patient verbalized understanding of and agreement with plan    Tr Guallpa MD    CC  Patient Care Team:  No Ref-Primary, Physician as PCP - General  Romelia Coelho MD as MD (OB/Gyn)  Romleia Coelho MD as Referring Physician (OB/Gyn)  Lyly Chaudhry, ISH as PCP (Family Practice)  Joanne Jones MD as MD (Urology)  Shahnaz Portillo MD as MD (Family Practice)  Elliot Solomon MD as MD (Internal Medicine)  Vanessa Wong MD as MD (Cardiology)  Tr Guallpa MD as MD (Gynecologic Oncology)  Tr Guallpa MD as Assigned Cancer Care Provider  SELF, REFERRED

## 2024-09-09 ENCOUNTER — ONCOLOGY VISIT (OUTPATIENT)
Dept: ONCOLOGY | Facility: CLINIC | Age: 59
End: 2024-09-09
Attending: OBSTETRICS & GYNECOLOGY
Payer: COMMERCIAL

## 2024-09-09 VITALS
SYSTOLIC BLOOD PRESSURE: 121 MMHG | HEART RATE: 82 BPM | OXYGEN SATURATION: 98 % | TEMPERATURE: 98.4 F | RESPIRATION RATE: 15 BRPM | BODY MASS INDEX: 28.24 KG/M2 | DIASTOLIC BLOOD PRESSURE: 79 MMHG | WEIGHT: 169.7 LBS

## 2024-09-09 DIAGNOSIS — N94.89 ADNEXAL MASS: ICD-10-CM

## 2024-09-09 DIAGNOSIS — R93.89 ENDOMETRIAL THICKENING ON ULTRASOUND: ICD-10-CM

## 2024-09-09 DIAGNOSIS — K57.92 ACUTE DIVERTICULITIS: Primary | ICD-10-CM

## 2024-09-09 PROCEDURE — G0463 HOSPITAL OUTPT CLINIC VISIT: HCPCS | Performed by: OBSTETRICS & GYNECOLOGY

## 2024-09-09 PROCEDURE — 99214 OFFICE O/P EST MOD 30 MIN: CPT | Performed by: OBSTETRICS & GYNECOLOGY

## 2024-09-09 RX ORDER — CEFUROXIME AXETIL 500 MG/1
500 TABLET ORAL 2 TIMES DAILY
COMMUNITY
Start: 2024-09-06 | End: 2024-09-13

## 2024-09-09 RX ORDER — METRONIDAZOLE 500 MG/1
500 TABLET ORAL 2 TIMES DAILY
COMMUNITY
Start: 2024-09-06 | End: 2024-09-13

## 2024-09-09 ASSESSMENT — PAIN SCALES - GENERAL: PAINLEVEL: NO PAIN (0)

## 2024-09-09 NOTE — LETTER
2024      Roxana Lazaro  [address]  [place] [address]      Dear Colleague,    Thank you for referring your patient, Roxana Lazaro, to the [facility]. Please see a copy of my visit note below.    RE: Roxana Lazaro   : 1965  PRONOUNS: she/her/hers  JOHANNA: 2024  GYNECOLOGIC ONCOLOGIST: Tr Guallpa MD     CC: Roxana Lazaro is a 59 year old female with a history of post-menopausal bleeding and a L adnexal mass presenting for an acute visit regarding pelvic pain     INTERVAL HISTORY:  Roxana comes to the clinic accompanied by her friend. She was seen in the ED on 2024  for pelvic pain- imaging demonstrated a L ovarian cyst. Concern was for diverticulitis- CT recommended which she declined at that time. She is concerned that she still has diverticulitis.     She continues to have lower abdominopelvic pain- feels it may be improving somewhat. Also notes some intermittent discomfort to the left upper quadrant and umbilicus. Denies fevers, chills, nausea, or vomiting. States her BMs are regular and normal but later admits she has had some rectal discomfort and thinks she may have a hemorrhoid. Feels she is not urinating as well- having a weak urinary stream, voiding more frequently, and had an episode of dysuria a few days ago. She has been eating less- feels that she is grazing now rather than eating full meals, gets full quicker than she used to.     She does have occasional brown vaginal discharge but denies darby bleeding. She reports vaginal soreness that has been persisting- on HRT with estradiol patch and prometrium, had reached out to her gynecology team ([facility]) to raise the dose due to vaginal discomfort. Last Pap in [facility] Every[facility] 2019, NILM, no HPV testing done.     She is currently on treatment for diverticulitis and reports her primary MD suggested she does not consider surgery until this is resolved.      ONCOLOGY  HISTORY:  Laparotomy with Dr. Vázquez in 2006 (about) for a left pelvic mass- unknown pathology     11/10/23 Pelvic US:  Impression  1. The uterus  is normal in appearance.  2. The right ovary was not identified in today's study.  3. The left ovary was noted to have a fluid filled cyst with septations,  5.3 x 4.4 x 4.4 cm size.  It was 3.2 cm in 2021.       1/29/24: Established with Dr. Guallpa for evaluation of post-menopausal bleeding     2/14/24: D&C. A. Endocervix, Curettage:  - Fragments of mucus, benign endometrium, and benign squamous epithelium     B. Endometrium, Curettage:  - Fragments of inactive endometrium with cystic glands  - Mucus and benign squamous epithelium  - Negative for hyperplasia, atypia, or malignancy    8/14/24: ED visit for LLQ pain  Imaging  US PELVIS COMPLETE TA AND TV    Result Date: 8/14/2024  For Patients: As a result of the 21st Century Cures Act, medical imaging exams and procedure reports are released immediately into your electronic medical record. You may view this report before your referring provider. If you have questions, please contact your health care provider. EXAM: US PELVIS COMPLETE TA AND TV LOCATION: [facility] DATE: 8/14/2024 INDICATION: Pelvic Pain/tenderness COMPARISON: 11/10/23, 2/14/24 TECHNIQUE: Transabdominal scans were performed. Endovaginal ultrasound was performed to better visualize the adnexa. FINDINGS: UTERUS: 8.6 x 4.6 x 5.8 cm. Normal size and contour. Heterogeneous echotexture. Ill-defined heterogeneous mildly hyperechoic uterine junctional zone. Associated mild hypervascularity. Left anterior subserosal solid nodular lesion measuring 2.7 x 2.1 x 1.4 cm ENDOMETRIUM: 5 mm. Normal smooth endometrium. RIGHT OVARY: Not visualized due to technical factors. LEFT OVARY: 5.0 x 3.6 x 5.0 cm. Normal color and spectral Doppler flow demonstrated. Simple 3.7 x 3.5 x 3.0 cm cyst. No significant free fluid.     1. Simple 3.7 cm left ovarian cyst is likely  "physiologic. No follow-up is indicated if the patient is asymptomatic. If the patient is symptomatic, recommend ultrasound follow-up in 6-12 weeks. 2. No evidence for left ovarian torsion. 3. Nonvisualized right ovary. 4. Findings suspicious for uterine adenomyosis. 5. Small subserosal uterine fibroid.      Per ED notes, concern for diverticulitis- recommended CT, declined by patient.    8/30/24 CT  IMPRESSION:   1.  Acute diverticulitis of the proximal sigmoid colon. No perforation or abscess.  2.  4.6 x 3.4 cm complex cystic structure within the left adnexa. This is slightly increased in size from March 2023 CT scan. Gynecologic consultation is recommended.  3.  Uterine fibroids.  4.  Small fat-containing umbilical hernia and small fat-containing supraumbilical ventral hernia.         Past Medical History        Past Medical History:   Diagnosis Date     Depression       Endometriosis       Fatty liver       GERD (gastroesophageal reflux disease)       H. pylori infection       resolved     Hyperlipidemia       Palpitations       Polycystic ovaries       PONV (postoperative nausea and vomiting)       Sleep apnea       pt states \"mild\" no c-pap use     Spinal stenosis           Past Surgical History         Past Surgical History:   Procedure Laterality Date     AS RAD RESEC TONSIL/PILLARS         CHOLECYSTECTOMY         DILATION AND CURETTAGE N/A 2/14/2024     Procedure: DILATION AND CURETTAGE, UTERUS;  Surgeon: Tr Guallpa MD;  Location: [facility]     DILATION AND CURETTAGE, OPERATIVE HYSTEROSCOPY WITH MORCELLATOR, COMBINED N/A 12/04/2015     Procedure: COMBINED DILATION AND CURETTAGE, OPERATIVE HYSTEROSCOPY WITH MORCELLATOR;  Surgeon: Romelia Coelho MD;  Location: [facility]     ENDOSCOPY         lap choley   01/01/1994     left ovarian cystectomy   07/01/2003     complicated by wound infection, elevated CA-125, , endometriosis     OPERATIVE HYSTEROSCOPY WITH MORCELLATOR   12/20/2013     Procedure: " OPERATIVE HYSTEROSCOPY WITH MORCELLATOR;  Hysteroscopy,   Polypectomy with  Myosure, removal of labial skin tag, D  & C;  Surgeon: Romelia Coelho MD;  Location:  OR         Social History            Socioeconomic History     Marital status:    Tobacco Use     Smoking status: Former       Current packs/day: 1.00       Average packs/day: 1 pack/day for 10.0 years (10.0 ttl pk-yrs)       Types: Cigarettes     Smokeless tobacco: Never   Substance and Sexual Activity     Alcohol use: Not Currently       Alcohol/week: 0.8 standard drinks of alcohol       Types: 1 Standard drinks or equivalent per week       Comment: two per month     Drug use: Never     Sexual activity: Yes       Partners: Male      Social Determinants of Health           Financial Resource Strain: Low Risk  (11/9/2023)     [facility]     Financial Resource Strain       Difficulty of Paying Living Expenses: 3   Food Insecurity: No Food Insecurity (11/9/2023)     Received from [facility]     Food Insecurity       Worried About Running Out of Food in the Last Year: 1   Transportation Needs: No Transportation Needs (11/9/2023)     [facility]     Transportation Needs       Lack of Transportation (Medical): 1   Social Connections: Socially Integrated (11/9/2023)     Received from [facility]     Social Connections       Frequency of Communication with Friends and Family: 0   Housing Stability: Low Risk  (11/9/2023)     [facility]     Housing Stability       Unable to Pay for Housing in the Last Year: 1      Family  History         Family History   Problem Relation Age of Onset     Thyroid Disease Mother 60         thyroid cancer     Gynecology Mother 40         pelvic pain     Gynecology Sister 30         pelvic pain     Alcohol/Drug Sister 37          alcholism     Cancer Maternal Grandmother           pancreatic CA     Breast Cancer Maternal Aunt           and maternal cousin; maternal aunt with second breast cancer     Breast Cancer Maternal Cousin       Cancer Other           mat GM's mom ovarian CA     Breast Cancer Other           told by a paternal aunt a lot of breast CA in their family, do not know details            Allergies         Allergies   Allergen Reactions     Adhesive Tape Other (See Comments)       Reddness and irritation     Azithromycin Other (See Comments)     Entocort Ec [Budesonide] GI Disturbance       Nausea and vomiting     Sulfa Antibiotics Rash     Vancomycin Rash     Zantac [Ranitidine] Rash               OBJECTIVE:     PHYSICAL EXAM:  /81 (BP Location: Right arm, Patient Position: Sitting, Cuff Size: Adult Regular)   Pulse 90   Temp 98.4  F (36.9  C) (Oral)   Resp 20   Wt 78.3 kg (172 lb 9.6 oz)   LMP 2015 (Approximate)   SpO2 97%   BMI 28.72 kg/m          CONSTITUTIONAL: Alert non-toxic appearing female in no acute distress, appears nervous  HEAD: Normocephalic, atraumatic  NECK: Neck supple without lymphadenopathy  RESPIRATORY: Lungs clear to auscultation, respirations unlabored  CV: Regular rate and rhythm, S1S2, no clicks, murmurs, rubs, or gallops; bilateral lower extremities with trace edema to ankles  GASTROINTESTINAL: Abdomen soft, non-distended but tender to LUQ, umbilical region, and LLQ. Palpable fullness to LLQ. No palpable organomegaly.   GENITOURINARY: External genitalia and urethral meatus pink without lesions, masses, or excoriation. Vagina pink and smooth without masses or lesions. Cervix without visible abnormality. Previous tenderness minimally in  evidence.  No appreciable masses, no peritoneal sudding  LYMPHATIC: Cervical, supraclavicular, and inguinal nodes without lymphadenopathy  MUSCULOSKELETAL: Moves all extremities, no obvious muscle wasting  NEUROLOGIC: No gross deficits, normal gait  SKIN: Appropriate color for race, warm and dry, no rashes or lesions to unclothed skin  PSYCHIATRIC: Pleasant and interactive, affect bright, makes appropriate eye contact, thought process linear            ASSESSMENT/PLAN:     LLQ pain / complex mass- known adnexal mass that appeared 2cm larger on ultrasound from 8/14-->8/29. Mass has waxed and waned in presence of inflammation, low CA-125 and no new CT fidnings.  EMS thickened but no bleeding and on HRT with negative D&C in 2/2024.  WE have discussed that malignancy is neither confirmed nor excluded.  WE have discussed the pros and cons of surgery an observation at length.  She is leaning toward surgery, but not yet committed, and requests time to consider these options and address her current diverticulitis.  This is reasonable - she will call when her decision is made  Reviewed alarm s/sxs and when to seek further care  Patient verbalized understanding of and agreement with plan    Tr Guallpa MD    CC  Patient Care Team:  No Ref-Primary, Physician as PCP - General  Romelia Coelho MD as MD (OB/Gyn)  Romelia Coelho MD as Referring Physician (OB/Gyn)  Lyly Chaudhry NP as PCP (Family Practice)  Joanne Jones MD as MD (Urology)  Shahnaz Portillo MD as MD (Family Practice)  Elliot Solomon MD as MD (Internal Medicine)  Vanessa Wong MD as MD (Cardiology)  Tr Guallpa MD as MD (Gynecologic Oncology)  Tr Guallpa MD as Assigned Cancer Care Provider  SELF, REFERRED        Again, thank you for allowing me to participate in the care of your patient.        Sincerely,        Tr Guallpa MD

## 2024-09-09 NOTE — NURSING NOTE
"Oncology Rooming Note    September 9, 2024 7:01 AM   Roxana Lazaro is a 59 year old female who presents for:    Chief Complaint   Patient presents with    Oncology Clinic Visit     Left ovarian cyst     Initial Vitals: /79 (BP Location: Right arm, Patient Position: Sitting, Cuff Size: Adult Regular)   Pulse 82   Temp 98.4  F (36.9  C) (Oral)   Resp 15   Wt 77 kg (169 lb 11.2 oz)   LMP 09/28/2015 (Approximate)   SpO2 98%   BMI 28.24 kg/m   Estimated body mass index is 28.24 kg/m  as calculated from the following:    Height as of 8/10/24: 1.651 m (5' 5\").    Weight as of this encounter: 77 kg (169 lb 11.2 oz). Body surface area is 1.88 meters squared.  No Pain (0) Comment: Data Unavailable   Patient's last menstrual period was 09/28/2015 (approximate).  Allergies reviewed: Yes  Medications reviewed: Yes    Medications: Medication refills not needed today.  Pharmacy name entered into EPIC:    [id number] - [place], [place] - [address] AT [facility] [address]    Frailty Screening:   Is the patient here for a new oncology consult visit in cancer care? 2. No      Clinical concerns: Pt reports no new concerns.       Shalini Weir, EMT     "

## 2024-09-09 NOTE — PATIENT INSTRUCTIONS
It was a pleasure seeing you in clinic today-please reach out if there are any further questions that arise following today's visit.  During business hours, you may reach me at [phone].  For urgent/emergent questions after business hours, you may reach the on-call Gynecologic Oncology Resident through the [facility]  at [phone].    All normal test results are usually communicated via letter or [email or web address] message.  Abnormal results (those that require a change in the previously discussed plan of care) are usually communicated via a phone call.    I recommend signing up for Breezy access if you have not already done so.  This allows you online access to your lab results and also helps you communicate efficiently with your clinic should any questions arise in your care.    Call if desires surgery    Dr Saloni Fletcher RN  Phone:  [phone]  Fax:  [phone]

## 2024-11-15 ENCOUNTER — PATIENT OUTREACH (OUTPATIENT)
Dept: ONCOLOGY | Facility: CLINIC | Age: 59
End: 2024-11-15

## 2024-11-20 ENCOUNTER — PATIENT OUTREACH (OUTPATIENT)
Dept: ONCOLOGY | Facility: CLINIC | Age: 59
End: 2024-11-20
Payer: COMMERCIAL

## 2024-11-21 NOTE — PROGRESS NOTES
Pt left message on voice mail  Would like to schedule surgery    
Spoke with pt   Explained to pt that I will need to reach out to md to put in orders then surgery can be scheduled  Pt is looking for options in January  Surgery scheduler will reach out as soon as orders are available  Pt reports understanding  
weakness/fatigue

## 2024-11-26 DIAGNOSIS — N94.89 ADNEXAL MASS: Primary | ICD-10-CM

## 2024-11-26 DIAGNOSIS — N95.0 PMB (POSTMENOPAUSAL BLEEDING): ICD-10-CM

## 2024-11-26 RX ORDER — HEPARIN SODIUM 5000 [USP'U]/.5ML
5000 INJECTION, SOLUTION INTRAVENOUS; SUBCUTANEOUS
OUTPATIENT
Start: 2024-11-26

## 2024-11-26 RX ORDER — CEFAZOLIN SODIUM 2 G/50ML
2 SOLUTION INTRAVENOUS
OUTPATIENT
Start: 2024-11-26

## 2024-11-26 RX ORDER — PHENAZOPYRIDINE HYDROCHLORIDE 200 MG/1
200 TABLET, FILM COATED ORAL ONCE
OUTPATIENT
Start: 2024-11-26 | End: 2024-11-26

## 2024-11-26 RX ORDER — CEFAZOLIN SODIUM 2 G/50ML
2 SOLUTION INTRAVENOUS SEE ADMIN INSTRUCTIONS
OUTPATIENT
Start: 2024-11-26

## 2024-12-04 ENCOUNTER — TELEPHONE (OUTPATIENT)
Dept: ONCOLOGY | Facility: CLINIC | Age: 59
End: 2024-12-04
Payer: COMMERCIAL

## 2024-12-04 NOTE — TELEPHONE ENCOUNTER
"Called to schedule surgery with: Dr. Guallpa    Spoke with: Roxana     Date of Surgery: 2/5    Estimated Arrival time Discussed with Patient:  No    Location of surgery: [facility]    Pre-operative H&P:  Patient states she received a call from phone number [phone] today about needed to move \"pre-op\" from 1/13 to 1/27 at 7:00 AM with Dr. Guallpa.    Additional Appointments: N/A    Post-operative Appointment: RNCC to schedule    Discussed with patient pre-op RN will call 2-3 days prior to surgery with arrival time and instructions:  Yes     Standard Surgery Packet: Yes to be sent via US mail    All patients questions were answered and was instructed to contact the clinic with any questions or concerns.     Additional Comments: N/A    Latonia Deal on 12/4/2024 at 2:56 PM    "

## 2024-12-18 ENCOUNTER — PATIENT OUTREACH (OUTPATIENT)
Dept: ONCOLOGY | Facility: CLINIC | Age: 59
End: 2024-12-18
Payer: COMMERCIAL

## 2024-12-18 NOTE — PROGRESS NOTES
Spoke with pt   Has surgery set up for 2/4/25  Recently had an mri of abdomen which showed a mass on pancreas.  She is set up for endoscopic utlrasound and biopsy on 1/10/25  Pt wonders about surgery    Asked pt to have a copy of the films/report sent to our clinic-pt will drop off this week  Will have md review these and follow-up with recommendations  Likely will wait for results of biopsy before making any changes to OR date  Will follow-up with pt when plans are finalized

## 2024-12-22 ENCOUNTER — HEALTH MAINTENANCE LETTER (OUTPATIENT)
Age: 59
End: 2024-12-22

## 2025-01-24 NOTE — PROGRESS NOTES
RE: Roxana Lazaro   : 1965  PRONOUNS: she/her/hers  JOHANNA: 2025  GYNECOLOGIC ONCOLOGIST: Tr Guallpa MD     CC: Roxana Lazaro is a 59 year old female with a history of post-menopausal bleeding and a L adnexal mass presents in follow-up.     INTERVAL HISTORY:  Roxana comes to the clinic accompanied by her friend. She was seen in the ED on 2024  for pelvic pain- imaging demonstrated a L ovarian cyst. Concern was for diverticulitis- CT recommended which she declined at that time. She is concerned that she still has diverticulitis.     She continues to have lower abdominopelvic pain- feels it may be improving somewhat. Also notes some intermittent discomfort to the left upper quadrant and umbilicus. Denies fevers, chills, nausea, or vomiting. States her BMs are regular and normal but later admits she has had some rectal discomfort and thinks she may have a hemorrhoid. Feels she is not urinating as well- having a weak urinary stream, voiding more frequently, and had an episode of dysuria a few days ago. She has been eating less- feels that she is grazing now rather than eating full meals, gets full quicker than she used to.     She does have occasional brown vaginal discharge but denies darby bleeding. She reports vaginal soreness that has been persisting- on HRT with estradiol patch and prometrium, had reached out to her gynecology team ([facility]) to raise the dose due to vaginal discomfort. Last Pap in Care Everywhere 2019, NILM, no HPV testing done.     She has been treated for diverticulitis and has no current symptoms (except some diarrhea - not clearly related)    ONCOLOGY HISTORY:  Laparotomy with Dr. Vázquez in  (about) for a left pelvic mass- unknown pathology     11/10/23 Pelvic US:  Impression  1. The uterus  is normal in appearance.  2. The right ovary was not identified in today's study.  3. The left ovary was noted to have a fluid filled cyst with septations,  5.3 x  "4.4 x 4.4 cm size.  It was 3.2 cm in 2021.       1/29/24: Established with Dr. Guallpa for evaluation of post-menopausal bleeding     2/14/24: D&C. A. Endocervix, Curettage:  - Fragments of mucus, benign endometrium, and benign squamous epithelium     B. Endometrium, Curettage:  - Fragments of inactive endometrium with cystic glands  - Mucus and benign squamous epithelium  - Negative for hyperplasia, atypia, or malignancy    8/14/24: ED visit for LLQ pain    US:  1. Simple 3.7 cm left ovarian cyst is likely physiologic. No follow-up is indicated if the patient is asymptomatic. If the patient is symptomatic, recommend ultrasound follow-up in 6-12 weeks. 2. No evidence for left ovarian torsion. 3. Nonvisualized right ovary. 4. Findings suspicious for uterine adenomyosis. 5. Small subserosal uterine fibroid.      Per ED notes, concern for diverticulitis- recommended CT, declined by patient.    8/30/24 CT  IMPRESSION:   1.  Acute diverticulitis of the proximal sigmoid colon. No perforation or abscess.  2.  4.6 x 3.4 cm complex cystic structure within the left adnexa. This is slightly increased in size from March 2023 CT scan. Gynecologic consultation is recommended.  3.  Uterine fibroids.  4.  Small fat-containing umbilical hernia and small fat-containing supraumbilical ventral hernia.    She had an MRI at her own request (private and paid).  Is being scanned into the records here, but demonstrates a small multicystic versus septated mass (smaller than previously reported).  Incidental finding of small cystic pancreatic mass.     Past Medical History        Past Medical History:   Diagnosis Date    Depression      Endometriosis      Fatty liver      GERD (gastroesophageal reflux disease)      H. pylori infection       resolved    Hyperlipidemia      Palpitations      Polycystic ovaries      PONV (postoperative nausea and vomiting)      Sleep apnea       pt states \"mild\" no c-pap use    Spinal stenosis           Past " Surgical History         Past Surgical History:   Procedure Laterality Date    AS RAD RESEC TONSIL/PILLARS        CHOLECYSTECTOMY        DILATION AND CURETTAGE N/A 2/14/2024     Procedure: DILATION AND CURETTAGE, UTERUS;  Surgeon: Tr Guallpa MD;  Location: [facility] OR    DILATION AND CURETTAGE, OPERATIVE HYSTEROSCOPY WITH MORCELLATOR, COMBINED N/A 12/04/2015     Procedure: COMBINED DILATION AND CURETTAGE, OPERATIVE HYSTEROSCOPY WITH MORCELLATOR;  Surgeon: Romelia Coelho MD;  Location: [facility] OR    ENDOSCOPY        lap choley   01/01/1994    left ovarian cystectomy   07/01/2003     complicated by wound infection, elevated CA-125, , endometriosis    OPERATIVE HYSTEROSCOPY WITH MORCELLATOR   12/20/2013     Procedure: OPERATIVE HYSTEROSCOPY WITH MORCELLATOR;  Hysteroscopy,   Polypectomy with  Myosure, removal of labial skin tag, D  & C;  Surgeon: Romelia Coelho MD;  Location: [facility] OR         Social History            Socioeconomic History    Marital status:    Tobacco Use    Smoking status: Former       Current packs/day: 1.00       Average packs/day: 1 pack/day for 10.0 years (10.0 ttl pk-yrs)       Types: Cigarettes    Smokeless tobacco: Never   Substance and Sexual Activity    Alcohol use: Not Currently       Alcohol/week: 0.8 standard drinks of alcohol       Types: 1 Standard drinks or equivalent per week       Comment: two per month    Drug use: Never    Sexual activity: Yes       Partners: Male      Social Determinants of Health           Financial Resource Strain: Low Risk  (11/9/2023)     Received from [facility] [employer] [facility], [facility]     Financial Resource Strain      Difficulty of Paying Living Expenses: 3   Food Insecurity: No Food Insecurity (11/9/2023)     Received from [facility] [employer] [facility], [facility]     Food Insecurity      Worried About Running Out of Food in the Last Year:  1   Transportation Needs: No Transportation Needs (2023)     [facility] from [facility], [employer] [facility]     Transportation Needs      Lack of Transportation (Medical): 1   Social Connections: Socially Integrated (2023)     Received from [employer] [facility], [facility]     Social Connections      Frequency of Communication with Friends and Family: 0   Housing Stability: Low Risk  (2023)     [facility] from [facility], [facility] [employer] [facility]     Housing Stability      Unable to Pay for Housing in the Last Year: 1      Family History         Family History   Problem Relation Age of Onset    Thyroid Disease Mother 60         thyroid cancer    Gynecology Mother 40         pelvic pain    Gynecology Sister 30         pelvic pain    Alcohol/Drug Sister 37          alcholism    Cancer Maternal Grandmother           pancreatic CA    Breast Cancer Maternal Aunt           and maternal cousin; maternal aunt with second breast cancer    Breast Cancer Maternal Cousin      Cancer Other           mat GM's mom ovarian CA    Breast Cancer Other           told by a paternal aunt a lot of breast CA in their family, do not know details            Allergies         Allergies   Allergen Reactions    Adhesive Tape Other (See Comments)       Reddness and irritation    Azithromycin Other (See Comments)    Entocort Ec [Budesonide] GI Disturbance       Nausea and vomiting    Sulfa Antibiotics Rash    Vancomycin Rash    Zantac [Ranitidine] Rash               OBJECTIVE:     PHYSICAL EXAM:  LMP 2015 (Approximate)    ECOG PS 0    CONSTITUTIONAL: Alert non-toxic appearing female in no acute distress, appears nervous  HEAD: Normocephalic, atraumatic  NECK: Neck supple without lymphadenopathy  RESPIRATORY: Lungs clear to auscultation, respirations  unlabored  CV: Regular rate and rhythm, S1S2, no clicks, murmurs, rubs, or gallops; bilateral lower extremities without edema  GASTROINTESTINAL: Abdomen soft, non-distended but tender to LUQ, umbilical region, and LLQ. Palpable fullness to LLQ. No palpable organomegaly.   GENITOURINARY: External genitalia and urethral meatus pink without lesions, masses, or excoriation. Vagina pink and smooth without masses or lesions. Cervix without visible abnormality. Tenderness to palpation of the left and right adnexa, more so on the left.  No appreciable masses, no peritoneal sudding  LYMPHATIC: Cervical, supraclavicular, and inguinal nodes without lymphadenopathy  MUSCULOSKELETAL: Moves all extremities, no obvious muscle wasting  NEUROLOGIC: No gross deficits, normal gait  SKIN: Appropriate color for race, warm and dry, no rashes or lesions to unclothed skin  PSYCHIATRIC: Pleasant and interactive, affect bright, makes appropriate eye contact, thought process linear            ASSESSMENT/PLAN:     LLQ pain / complex mass- Based on CT scan from the 8/30/24, there is a 4.6 x 3.4 cm complex cystic structure involving the left adnexa which compares with 4.1 x 3.2 cm on March 2023 CT scan. This appears slightly smaller on recent MRI.  On exam patient has continued tenderness to the bilateral adnexa, with a normal appearing cervix and no evidence of masses or bleeding. WE have again discussed surgery versus observation with intermittent scanning (annual pelvic ultrasound to monitor ovarian cyst size).  She remains conflicted about this in light of her improved symptoms, but the chronicity of the discomfort.   Will consider her options, and contact us regarding her planned surgery in February in the next few days.  WE have discussed that malignancy is neither confirmed nor excluded.  We have discussed the pros and cons of surgery an observation at length.  She is leaning more toward surgery but not yet committed, and requests time to  consider these options and address her current diverticulitis.  This is reasonable - she will call when her decision is made;    Reviewed alarm s/sxs and when to seek further care  I have had our oncology surgery team review her MRI, and they concur that a contrast enhanced MRI/MRCP is appropriate to evaluated the pancreatic lesion  Patient verbalized understanding of and agreement with plan    Tr Guallpa MD      Patient Care Team:  Larissa Hsu PA-C as PCP - General  Romelia Coelho MD as MD (OB/Gyn)  Romelia Coelho MD as Referring Physician (OB/Gyn)  Lyly Chaudhry NP as PCP (Family Practice)  Joanne Jones MD as MD (Urology)  Shahnaz Portillo MD as MD (Family Practice)  Elliot Solomon MD as MD (Internal Medicine)  Vanessa Wong MD as MD (Cardiology)  Tr Guallpa MD as MD (Gynecologic Oncology)  Tr Guallpa MD as Assigned Cancer Care Provider  Lety Charles APRN CNP as Assigned OBGYN Provider  SELF, REFERRED

## 2025-01-27 ENCOUNTER — ONCOLOGY VISIT (OUTPATIENT)
Dept: ONCOLOGY | Facility: CLINIC | Age: 60
End: 2025-01-27
Attending: OBSTETRICS & GYNECOLOGY
Payer: COMMERCIAL

## 2025-01-27 DIAGNOSIS — K86.89 PANCREATIC MASS: ICD-10-CM

## 2025-01-27 DIAGNOSIS — N94.89 ADNEXAL MASS: Primary | ICD-10-CM

## 2025-01-27 DIAGNOSIS — R93.89 ENDOMETRIAL THICKENING ON ULTRASOUND: ICD-10-CM

## 2025-01-27 PROCEDURE — 99214 OFFICE O/P EST MOD 30 MIN: CPT | Performed by: OBSTETRICS & GYNECOLOGY

## 2025-01-27 NOTE — LETTER
2025      Roxana Lazaro  [address]  [place] [address]      Dear Colleague,    Thank you for referring your patient, Roxana Lazaro, to the [facility]. Please see a copy of my visit note below.    RE: Roxana Lazaro   : 1965  PRONOUNS: she/her/hers  JOHANNA: 2025  GYNECOLOGIC ONCOLOGIST: Tr Guallpa MD     CC: Roxana Lazaro is a 59 year old female with a history of post-menopausal bleeding and a L adnexal mass presents in follow-up.     INTERVAL HISTORY:  Roxana comes to the clinic accompanied by her friend. She was seen in the ED on 2024  for pelvic pain- imaging demonstrated a L ovarian cyst. Concern was for diverticulitis- CT recommended which she declined at that time. She is concerned that she still has diverticulitis.     She continues to have lower abdominopelvic pain- feels it may be improving somewhat. Also notes some intermittent discomfort to the left upper quadrant and umbilicus. Denies fevers, chills, nausea, or vomiting. States her BMs are regular and normal but later admits she has had some rectal discomfort and thinks she may have a hemorrhoid. Feels she is not urinating as well- having a weak urinary stream, voiding more frequently, and had an episode of dysuria a few days ago. She has been eating less- feels that she is grazing now rather than eating full meals, gets full quicker than she used to.     She does have occasional brown vaginal discharge but denies darby bleeding. She reports vaginal soreness that has been persisting- on HRT with estradiol patch and prometrium, had reached out to her gynecology team ([facility]) to raise the dose due to vaginal discomfort. Last Pap in Care Everywhere 2019, NILM, no HPV testing done.     She has been treated for diverticulitis and has no current symptoms (except some diarrhea - not clearly related)    ONCOLOGY HISTORY:  Laparotomy with Dr. Vázquez in  (about) for a  left pelvic mass- unknown pathology     11/10/23 Pelvic US:  Impression  1. The uterus  is normal in appearance.  2. The right ovary was not identified in today's study.  3. The left ovary was noted to have a fluid filled cyst with septations,  5.3 x 4.4 x 4.4 cm size.  It was 3.2 cm in 2021.       1/29/24: Established with Dr. Guallpa for evaluation of post-menopausal bleeding     2/14/24: D&C. A. Endocervix, Curettage:  - Fragments of mucus, benign endometrium, and benign squamous epithelium     B. Endometrium, Curettage:  - Fragments of inactive endometrium with cystic glands  - Mucus and benign squamous epithelium  - Negative for hyperplasia, atypia, or malignancy    8/14/24: ED visit for LLQ pain    US:  1. Simple 3.7 cm left ovarian cyst is likely physiologic. No follow-up is indicated if the patient is asymptomatic. If the patient is symptomatic, recommend ultrasound follow-up in 6-12 weeks. 2. No evidence for left ovarian torsion. 3. Nonvisualized right ovary. 4. Findings suspicious for uterine adenomyosis. 5. Small subserosal uterine fibroid.      Per ED notes, concern for diverticulitis- recommended CT, declined by patient.    8/30/24 CT  IMPRESSION:   1.  Acute diverticulitis of the proximal sigmoid colon. No perforation or abscess.  2.  4.6 x 3.4 cm complex cystic structure within the left adnexa. This is slightly increased in size from March 2023 CT scan. Gynecologic consultation is recommended.  3.  Uterine fibroids.  4.  Small fat-containing umbilical hernia and small fat-containing supraumbilical ventral hernia.    She had an MRI at her own request (private and paid).  Is being scanned into the records here, but demonstrates a small multicystic versus septated mass (smaller than previously reported).  Incidental finding of small cystic pancreatic mass.     Past Medical History        Past Medical History:   Diagnosis Date     Depression       Endometriosis       Fatty liver       GERD  "(gastroesophageal reflux disease)       H. pylori infection       resolved     Hyperlipidemia       Palpitations       Polycystic ovaries       PONV (postoperative nausea and vomiting)       Sleep apnea       pt states \"mild\" no c-pap use     Spinal stenosis           Past Surgical History         Past Surgical History:   Procedure Laterality Date     AS RAD RESEC TONSIL/PILLARS         CHOLECYSTECTOMY         DILATION AND CURETTAGE N/A 2/14/2024     Procedure: DILATION AND CURETTAGE, UTERUS;  Surgeon: Tr Guallpa MD;  Location: [facility] OR     DILATION AND CURETTAGE, OPERATIVE HYSTEROSCOPY WITH MORCELLATOR, COMBINED N/A 12/04/2015     Procedure: COMBINED DILATION AND CURETTAGE, OPERATIVE HYSTEROSCOPY WITH MORCELLATOR;  Surgeon: Romelia Coelho MD;  Location: [facility] OR     ENDOSCOPY         lap choley   01/01/1994     left ovarian cystectomy   07/01/2003     complicated by wound infection, elevated CA-125, , endometriosis     OPERATIVE HYSTEROSCOPY WITH MORCELLATOR   12/20/2013     Procedure: OPERATIVE HYSTEROSCOPY WITH MORCELLATOR;  Hysteroscopy,   Polypectomy with  Myosure, removal of labial skin tag, D  & C;  Surgeon: Romelia Coelho MD;  Location: [facility] OR         Social History            Socioeconomic History     Marital status:    Tobacco Use     Smoking status: Former       Current packs/day: 1.00       Average packs/day: 1 pack/day for 10.0 years (10.0 ttl pk-yrs)       Types: Cigarettes     Smokeless tobacco: Never   Substance and Sexual Activity     Alcohol use: Not Currently       Alcohol/week: 0.8 standard drinks of alcohol       Types: 1 Standard drinks or equivalent per week       Comment: two per month     Drug use: Never     Sexual activity: Yes       Partners: Male      Social Determinants of Health           Financial Resource Strain: Low Risk  (11/9/2023)     Received from [facility] [employer] [facility], [facility] [employer] [facility]     Financial Resource " Strain       Difficulty of Paying Living Expenses: 3   Food Insecurity: No Food Insecurity (2023)     [facility], [facility] [facility]     Food Insecurity       Worried About Running Out of Food in the Last Year: 1   Transportation Needs: No Transportation Needs (2023)     [facility], [facility]     Transportation Needs       Lack of Transportation (Medical): 1   Social Connections: Socially Integrated (2023)     Received from [facility] [employer] [facility], [facility]     Social Connections       Frequency of Communication with Friends and Family: 0   Housing Stability: Low Risk  (2023)     [facility], [facility]     Housing Stability       Unable to Pay for Housing in the Last Year: 1      Family History         Family History   Problem Relation Age of Onset     Thyroid Disease Mother 60         thyroid cancer     Gynecology Mother 40         pelvic pain     Gynecology Sister 30         pelvic pain     Alcohol/Drug Sister 37          alcholism     Cancer Maternal Grandmother           pancreatic CA     Breast Cancer Maternal Aunt           and maternal cousin; maternal aunt with second breast cancer     Breast Cancer Maternal Cousin       Cancer Other           mat GM's mom ovarian CA     Breast Cancer Other           told by a paternal aunt a lot of breast CA in their family, do not know details            Allergies         Allergies   Allergen Reactions     Adhesive Tape Other (See Comments)       Reddness and irritation     Azithromycin Other (See Comments)     Entocort Ec [Budesonide] GI Disturbance       Nausea and vomiting     Sulfa Antibiotics Rash     Vancomycin Rash     Zantac [Ranitidine] Rash                OBJECTIVE:     PHYSICAL EXAM:  LMP 09/28/2015 (Approximate)    ECOG PS 0    CONSTITUTIONAL: Alert non-toxic appearing female in no acute distress, appears nervous  HEAD: Normocephalic, atraumatic  NECK: Neck supple without lymphadenopathy  RESPIRATORY: Lungs clear to auscultation, respirations unlabored  CV: Regular rate and rhythm, S1S2, no clicks, murmurs, rubs, or gallops; bilateral lower extremities without edema  GASTROINTESTINAL: Abdomen soft, non-distended but tender to LUQ, umbilical region, and LLQ. Palpable fullness to LLQ. No palpable organomegaly.   GENITOURINARY: External genitalia and urethral meatus pink without lesions, masses, or excoriation. Vagina pink and smooth without masses or lesions. Cervix without visible abnormality. Tenderness to palpation of the left and right adnexa, more so on the left.  No appreciable masses, no peritoneal sudding  LYMPHATIC: Cervical, supraclavicular, and inguinal nodes without lymphadenopathy  MUSCULOSKELETAL: Moves all extremities, no obvious muscle wasting  NEUROLOGIC: No gross deficits, normal gait  SKIN: Appropriate color for race, warm and dry, no rashes or lesions to unclothed skin  PSYCHIATRIC: Pleasant and interactive, affect bright, makes appropriate eye contact, thought process linear            ASSESSMENT/PLAN:     LLQ pain / complex mass- Based on CT scan from the 8/30/24, there is a 4.6 x 3.4 cm complex cystic structure involving the left adnexa which compares with 4.1 x 3.2 cm on March 2023 CT scan. This appears slightly smaller on recent MRI.  On exam patient has continued tenderness to the bilateral adnexa, with a normal appearing cervix and no evidence of masses or bleeding. WE have again discussed surgery versus observation with intermittent scanning (annual pelvic ultrasound to monitor ovarian cyst size).  She remains conflicted about this in light of her improved symptoms, but the chronicity of the discomfort.   Will consider her options,  and contact us regarding her planned surgery in February in the next few days.  WE have discussed that malignancy is neither confirmed nor excluded.  We have discussed the pros and cons of surgery an observation at length.  She is leaning more toward surgery but not yet committed, and requests time to consider these options and address her current diverticulitis.  This is reasonable - she will call when her decision is made;    Reviewed alarm s/sxs and when to seek further care  I have had our oncology surgery team review her MRI, and they concur that a contrast enhanced MRI/MRCP is appropriate to evaluated the pancreatic lesion  Patient verbalized understanding of and agreement with plan    Tr Guallpa MD    CC  Patient Care Team:  Larissa Hsu PA-C as PCP - General  Romelia Coelho MD as MD (OB/Gyn)  Romelia Coelho MD as Referring Physician (OB/Gyn)  Lyly Chaudhry NP as PCP (Family Practice)  Joanne Jones MD as MD (Urology)  Shahnaz Portillo MD as MD (Family Practice)  Elliot Solomon MD as MD (Internal Medicine)  Vanessa Wong MD as MD (Cardiology)  Tr Guallpa MD as MD (Gynecologic Oncology)  Tr Guallpa MD as Assigned Cancer Care Provider  Lety Charles APRN CNP as Assigned OBGYN Provider  SELF, REFERRED        Again, thank you for allowing me to participate in the care of your patient.        Sincerely,        Tr Guallpa MD    Electronically signed

## 2025-01-27 NOTE — PATIENT INSTRUCTIONS
It was a pleasure seeing you in clinic today-please reach out if there are any further questions that arise following today's visit.  During business hours, you may reach me at [phone].  For urgent/emergent questions after business hours, you may reach the on-call Gynecologic Oncology Resident through the [facility]  at [phone].    All normal test results are usually communicated via letter or bright boxhart message.  Abnormal results (those that require a change in the previously discussed plan of care) are usually communicated via a phone call.    I recommend signing up for [employer] access if you have not already done so.  This allows you online access to your lab results and also helps you communicate efficiently with your clinic should any questions arise in your care.    Please call if you decide to move forward with surgery    Dr Saloni Fletcher RN  Phone:  [phone]  Fax:  [phone]

## 2025-02-05 ENCOUNTER — PATIENT OUTREACH (OUTPATIENT)
Dept: ONCOLOGY | Facility: CLINIC | Age: 60
End: 2025-02-05
Payer: COMMERCIAL

## 2025-02-05 DIAGNOSIS — N94.89 ADNEXAL MASS: Primary | ICD-10-CM

## 2025-05-02 ENCOUNTER — HOSPITAL ENCOUNTER (OUTPATIENT)
Dept: MRI IMAGING | Facility: HOSPITAL | Age: 60
Discharge: HOME | End: 2025-05-02
Attending: OBSTETRICS & GYNECOLOGY | Admitting: OBSTETRICS & GYNECOLOGY
Payer: COMMERCIAL

## 2025-05-02 DIAGNOSIS — N94.89 ADNEXAL MASS: ICD-10-CM

## 2025-05-02 DIAGNOSIS — K86.89 PANCREATIC MASS: ICD-10-CM

## 2025-05-02 DIAGNOSIS — R93.89 ENDOMETRIAL THICKENING ON ULTRASOUND: ICD-10-CM

## 2025-05-02 PROCEDURE — 74183 MRI ABD W/O CNTR FLWD CNTR: CPT

## 2025-05-02 PROCEDURE — A9585 GADOBUTROL INJECTION: HCPCS | Performed by: OBSTETRICS & GYNECOLOGY

## 2025-05-02 PROCEDURE — 255N000002 HC RX 255 OP 636: Performed by: OBSTETRICS & GYNECOLOGY

## 2025-05-02 RX ORDER — GADOBUTROL 604.72 MG/ML
0.1 INJECTION INTRAVENOUS ONCE
Status: COMPLETED | OUTPATIENT
Start: 2025-05-02 | End: 2025-05-02

## 2025-05-02 RX ADMIN — GADOBUTROL 8 ML: 604.72 INJECTION INTRAVENOUS at 09:01

## 2025-05-05 ENCOUNTER — TELEPHONE (OUTPATIENT)
Dept: ONCOLOGY | Facility: CLINIC | Age: 60
End: 2025-05-05
Payer: COMMERCIAL

## 2025-05-05 NOTE — TELEPHONE ENCOUNTER
Called patient to discuss MRI.  No pancreatic mass (diverticulum).  No evidence of cancer.  Follow-up scan of ovaries pending in June.    MD Tr Dixon MD

## 2025-06-04 ENCOUNTER — HOSPITAL ENCOUNTER (OUTPATIENT)
Dept: MRI IMAGING | Facility: HOSPITAL | Age: 60
Discharge: HOME | End: 2025-06-04
Attending: OBSTETRICS & GYNECOLOGY
Payer: COMMERCIAL

## 2025-06-04 DIAGNOSIS — N94.89 ADNEXAL MASS: ICD-10-CM

## 2025-06-04 PROCEDURE — 72197 MRI PELVIS W/O & W/DYE: CPT

## 2025-06-04 PROCEDURE — A9585 GADOBUTROL INJECTION: HCPCS | Performed by: OBSTETRICS & GYNECOLOGY

## 2025-06-04 PROCEDURE — 255N000002 HC RX 255 OP 636: Performed by: OBSTETRICS & GYNECOLOGY

## 2025-06-04 RX ORDER — GADOBUTROL 604.72 MG/ML
0.1 INJECTION INTRAVENOUS ONCE
Status: COMPLETED | OUTPATIENT
Start: 2025-06-04 | End: 2025-06-04

## 2025-06-04 RX ADMIN — GADOBUTROL 8 ML: 604.72 INJECTION INTRAVENOUS at 11:25

## 2025-06-30 ENCOUNTER — TELEPHONE (OUTPATIENT)
Dept: ONCOLOGY | Facility: CLINIC | Age: 60
End: 2025-06-30
Payer: COMMERCIAL

## 2025-06-30 NOTE — TELEPHONE ENCOUNTER
Spoke to patient.  MRI issues resolved.  No VB.  Take E/P HRT.  Will follow with primary MD.  No plans for f/up here, but may return at any time.    Tr Guallpa MD

## 2025-07-01 ENCOUNTER — RESULTS FOLLOW-UP (OUTPATIENT)
Dept: ONCOLOGY | Facility: CLINIC | Age: 60
End: 2025-07-01

## 2025-07-02 ENCOUNTER — TRANSCRIBE ORDERS (OUTPATIENT)
Dept: OTHER | Age: 60
End: 2025-07-02

## 2025-07-02 DIAGNOSIS — J30.1 SEASONAL ALLERGIC RHINITIS DUE TO POLLEN: ICD-10-CM

## 2025-07-02 DIAGNOSIS — R05.9 COUGH, UNSPECIFIED TYPE: ICD-10-CM

## 2025-07-02 DIAGNOSIS — H57.89 RED EYES: Primary | ICD-10-CM

## 2025-07-03 ENCOUNTER — PATIENT OUTREACH (OUTPATIENT)
Dept: CARE COORDINATION | Facility: CLINIC | Age: 60
End: 2025-07-03
Payer: COMMERCIAL

## 2025-07-07 ENCOUNTER — OFFICE VISIT (OUTPATIENT)
Dept: OPHTHALMOLOGY | Facility: CLINIC | Age: 60
End: 2025-07-07
Attending: ALLERGY & IMMUNOLOGY
Payer: COMMERCIAL

## 2025-07-07 DIAGNOSIS — H57.89 RED EYES: Primary | ICD-10-CM

## 2025-07-07 DIAGNOSIS — H00.029 MEIBOMIANITIS, UNSPECIFIED LATERALITY: ICD-10-CM

## 2025-07-07 DIAGNOSIS — H01.006 BLEPHARITIS OF BOTH EYES, UNSPECIFIED EYELID, UNSPECIFIED TYPE: ICD-10-CM

## 2025-07-07 DIAGNOSIS — H01.003 BLEPHARITIS OF BOTH EYES, UNSPECIFIED EYELID, UNSPECIFIED TYPE: ICD-10-CM

## 2025-07-07 PROBLEM — M54.2 NECK PAIN, CHRONIC: Status: ACTIVE | Noted: 2018-11-08

## 2025-07-07 PROBLEM — F41.9 ANXIETY: Status: ACTIVE | Noted: 2018-10-22

## 2025-07-07 PROBLEM — G89.29 NECK PAIN, CHRONIC: Status: ACTIVE | Noted: 2018-11-08

## 2025-07-07 PROBLEM — N83.202 LEFT OVARIAN CYST: Status: ACTIVE | Noted: 2023-11-10

## 2025-07-07 PROBLEM — M48.02 DEGENERATIVE CERVICAL SPINAL STENOSIS: Status: ACTIVE | Noted: 2018-07-24

## 2025-07-07 PROBLEM — R20.2 PARESTHESIAS: Status: ACTIVE | Noted: 2022-10-27

## 2025-07-07 PROBLEM — N32.81 OVERACTIVE BLADDER: Status: ACTIVE | Noted: 2021-12-16

## 2025-07-07 PROBLEM — E53.8 B12 DEFICIENCY: Status: ACTIVE | Noted: 2023-08-07

## 2025-07-07 PROBLEM — R00.0 TACHYCARDIA: Status: ACTIVE | Noted: 2023-12-01

## 2025-07-07 PROBLEM — K13.21 LEUKOPLAKIA OF ORAL CAVITY: Status: ACTIVE | Noted: 2021-03-24

## 2025-07-07 PROBLEM — R51.9 CHRONIC DAILY HEADACHE: Status: ACTIVE | Noted: 2022-10-27

## 2025-07-07 PROBLEM — N39.46 MIXED INCONTINENCE: Status: ACTIVE | Noted: 2021-12-16

## 2025-07-07 PROBLEM — G47.33 OSA (OBSTRUCTIVE SLEEP APNEA): Status: ACTIVE | Noted: 2025-07-07

## 2025-07-07 PROBLEM — K76.0 HEPATIC STEATOSIS: Status: ACTIVE | Noted: 2020-08-27

## 2025-07-07 PROBLEM — R89.9 ABNORMAL LABORATORY TEST RESULT: Status: ACTIVE | Noted: 2019-08-29

## 2025-07-07 PROBLEM — K29.60 GASTRITIS, BILE ACID REFLUX: Status: ACTIVE | Noted: 2024-01-08

## 2025-07-07 PROBLEM — R07.9 CHEST PAIN: Status: ACTIVE | Noted: 2019-10-27

## 2025-07-07 PROBLEM — R68.89 GENERAL SYMPTOM: Status: ACTIVE | Noted: 2019-07-15

## 2025-07-07 PROBLEM — G56.03 BILATERAL CARPAL TUNNEL SYNDROME: Status: ACTIVE | Noted: 2022-11-07

## 2025-07-07 RX ORDER — ATORVASTATIN CALCIUM 10 MG/1
TABLET, FILM COATED ORAL
COMMUNITY
Start: 2024-10-03 | End: 2025-07-07

## 2025-07-07 RX ORDER — FOLIC ACID 1 MG/1
1000 TABLET ORAL DAILY
COMMUNITY
Start: 2025-06-27

## 2025-07-07 RX ORDER — ESTRADIOL 0.03 MG/D
1 FILM, EXTENDED RELEASE TRANSDERMAL
COMMUNITY
Start: 2024-10-06

## 2025-07-07 RX ORDER — METHOTREXATE 2.5 MG/1
5 TABLET ORAL
COMMUNITY
Start: 2025-06-13

## 2025-07-07 RX ORDER — MOXIFLOXACIN 5 MG/ML
1 SOLUTION/ DROPS OPHTHALMIC 3 TIMES DAILY
Qty: 3 ML | Refills: 1 | Status: SHIPPED | OUTPATIENT
Start: 2025-07-07

## 2025-07-07 RX ORDER — METFORMIN HYDROCHLORIDE 500 MG/1
1000 TABLET, EXTENDED RELEASE ORAL
COMMUNITY
Start: 2025-06-17

## 2025-07-07 RX ORDER — CARBOXYMETHYLCELLULOSE SODIUM 5 MG/ML
1 SOLUTION/ DROPS OPHTHALMIC 4 TIMES DAILY
COMMUNITY

## 2025-07-07 RX ORDER — PROGESTERONE 200 MG/1
200 CAPSULE ORAL AT BEDTIME
COMMUNITY
Start: 2024-09-11

## 2025-07-07 ASSESSMENT — REFRACTION_WEARINGRX
OS_SPHERE: -4.75
OD_AXIS: 088
OS_CYLINDER: +1.50
OD_SPHERE: -4.50
OD_CYLINDER: +1.75
OD_ADD: +2.25
OS_ADD: +2.25
OS_AXIS: 090
SPECS_TYPE: PAL

## 2025-07-07 ASSESSMENT — VISUAL ACUITY
OD_CC: 20/25
OS_CC: 20/40
CORRECTION_TYPE: GLASSES
OS_CC+: +2
OS_PH_CC+: -1
OS_PH_CC: 20/25
OD_CC+: -1
METHOD: SNELLEN - LINEAR

## 2025-07-07 ASSESSMENT — EXTERNAL EXAM - LEFT EYE: OS_EXAM: PROLAPSED FAT PADS: UPPER, LOWER

## 2025-07-07 ASSESSMENT — EXTERNAL EXAM - RIGHT EYE: OD_EXAM: PROLAPSED FAT PADS: UPPER, LOWER

## 2025-07-07 NOTE — PATIENT INSTRUCTIONS
Moxifloxacin one drop both eyes three times daily.   Prednisolone one drop both eyes three times daily.   Always wait 5 minutes between drops.  Warms packs three times daily for 10 minutes.  Call our office or message with the results in 7- 10 days.    Ankit Wallace M.D.  [phone]

## 2025-07-07 NOTE — PROGRESS NOTES
" Current Eye Medications:  Refresh bottle-(not PF) TID to QID both eyes     Subjective:  patient referred by Dr. Cespedes(allergist) for red watery eyes. Per Ana notes, she had a complete eye exam in June 2025 with [facility]. Has been seen there multiple times for red eye and has had red eyes for years.  Trial of Doxy, tobramycin drops, Prednisolone drops, Refresh and Oral Prednisone with Methotrexate for her arthritis(given by Rheumatology). Has only been on those for about a month after being diagnosed with \"arthritis in her hands.\" After going off the oral Pred a couple of weeks ago, the redness and watering has come back.  Eyes water all the time and feels there is something in there at all times.      Objective:  See Ophthalmology Exam.       Assessment:  Chronic, intermittent conjunctivitis both eyes.      Plan:  Moxifloxacin one drop both eyes three times daily.   Prednisolone one drop both eyes three times daily.   Always wait 5 minutes between drops.  Warms packs three times daily for 10 minutes.  Call our office or message with the results in 7- 10 days.    Ankit Wallace M.D.  [phone]      "

## 2025-07-07 NOTE — LETTER
"7/7/2025      Roxana Lazaro  [address] [place] [address]      Dear Colleague,    Thank you for referring your patient, Roxana Lazaro, to the [facility]. Please see a copy of my visit note below.     Current Eye Medications:  Refresh bottle-(not PF) TID to QID both eyes     Subjective:  patient referred by Dr. Cespedes(allergist) for red watery eyes. Per [facility] notes, she had a complete eye exam in June 2025 with [facility]. Has been seen there multiple times for red eye and has had red eyes for years.  Trial of Doxy, tobramycin drops, Prednisolone drops, Refresh and Oral Prednisone with Methotrexate for her arthritis(given by Rheumatology). Has only been on those for about a month after being diagnosed with \"arthritis in her hands.\" After going off the oral Pred a couple of weeks ago, the redness and watering has come back.  Eyes water all the time and feels there is something in there at all times.      Objective:  See Ophthalmology Exam.       Assessment:  Chronic, intermittent conjunctivitis both eyes.      Plan:  Moxifloxacin one drop both eyes three times daily.   Prednisolone one drop both eyes three times daily.   Always wait 5 minutes between drops.  Warms packs three times daily for 10 minutes.  Call our office or message with the results in 7- 10 days.    Ankit Wallace M.D.  [phone]        Again, thank you for allowing me to participate in the care of your patient.        Sincerely,        Ankit Wallace MD    Electronically signed"

## (undated) DEVICE — PREP POVIDONE-IODINE 10% SOLUTION 4OZ BOTTLE MDS093944

## (undated) DEVICE — ESU GROUND PAD ADULT W/CORD E7507

## (undated) DEVICE — SUCTION MANIFOLD NEPTUNE 2 SYS 4 PORT 0702-020-000

## (undated) DEVICE — GLOVE GAMMEX NEOPRENE ULTRA SZ 7.5 LF 8515

## (undated) DEVICE — PREP POVIDONE-IODINE 7.5% SCRUB 4OZ BOTTLE MDS093945

## (undated) DEVICE — LINEN TOWEL PACK X5 5464

## (undated) DEVICE — LINEN TOWEL PACK X6 WHITE 5487

## (undated) DEVICE — SOL NACL 0.9% IRRIG 1000ML BOTTLE 2F7124

## (undated) DEVICE — Device

## (undated) DEVICE — SOL WATER IRRIG 1000ML BOTTLE 2F7114

## (undated) RX ORDER — FENTANYL CITRATE 50 UG/ML
INJECTION, SOLUTION INTRAMUSCULAR; INTRAVENOUS
Status: DISPENSED
Start: 2024-02-14

## (undated) RX ORDER — ONDANSETRON 2 MG/ML
INJECTION INTRAMUSCULAR; INTRAVENOUS
Status: DISPENSED
Start: 2024-02-14

## (undated) RX ORDER — DEXAMETHASONE SODIUM PHOSPHATE 4 MG/ML
INJECTION, SOLUTION INTRA-ARTICULAR; INTRALESIONAL; INTRAMUSCULAR; INTRAVENOUS; SOFT TISSUE
Status: DISPENSED
Start: 2024-02-14

## (undated) RX ORDER — PROPOFOL 10 MG/ML
INJECTION, EMULSION INTRAVENOUS
Status: DISPENSED
Start: 2024-02-14

## (undated) RX ORDER — BUPIVACAINE HYDROCHLORIDE 5 MG/ML
INJECTION, SOLUTION EPIDURAL; INTRACAUDAL
Status: DISPENSED
Start: 2024-02-14

## (undated) RX ORDER — HEPARIN SODIUM 5000 [USP'U]/.5ML
INJECTION, SOLUTION INTRAVENOUS; SUBCUTANEOUS
Status: DISPENSED
Start: 2024-02-14

## (undated) RX ORDER — CEFAZOLIN SODIUM/WATER 2 G/20 ML
SYRINGE (ML) INTRAVENOUS
Status: DISPENSED
Start: 2024-02-14

## (undated) RX ORDER — KETOROLAC TROMETHAMINE 30 MG/ML
INJECTION, SOLUTION INTRAMUSCULAR; INTRAVENOUS
Status: DISPENSED
Start: 2024-02-14